# Patient Record
Sex: MALE | Race: WHITE | Employment: UNEMPLOYED | URBAN - METROPOLITAN AREA
[De-identification: names, ages, dates, MRNs, and addresses within clinical notes are randomized per-mention and may not be internally consistent; named-entity substitution may affect disease eponyms.]

---

## 2017-06-05 ENCOUNTER — HOSPITAL ENCOUNTER (OUTPATIENT)
Dept: SURGERY | Age: 32
Discharge: HOME | End: 2017-06-05
Attending: INTERNAL MEDICINE | Admitting: INTERNAL MEDICINE

## 2017-06-05 VITALS
BODY MASS INDEX: 19.46 KG/M2 | OXYGEN SATURATION: 100 % | RESPIRATION RATE: 16 BRPM | TEMPERATURE: 98.6 F | HEART RATE: 51 BPM | SYSTOLIC BLOOD PRESSURE: 130 MMHG | DIASTOLIC BLOOD PRESSURE: 85 MMHG | HEIGHT: 67 IN | WEIGHT: 124 LBS

## 2017-06-05 RX ORDER — LIDOCAINE HYDROCHLORIDE 10 MG/ML
0.1 INJECTION, SOLUTION EPIDURAL; INFILTRATION; INTRACAUDAL; PERINEURAL
Status: ACTIVE | OUTPATIENT
Start: 2017-06-05 | End: 2017-06-05

## 2017-06-05 RX ORDER — SODIUM CHLORIDE, SODIUM LACTATE, POTASSIUM CHLORIDE, CALCIUM CHLORIDE 600; 310; 30; 20 MG/100ML; MG/100ML; MG/100ML; MG/100ML
INJECTION, SOLUTION INTRAVENOUS ONCE
Status: COMPLETED | OUTPATIENT
Start: 2017-06-05 | End: 2017-06-05

## 2017-06-05 RX ADMIN — SODIUM CHLORIDE, SODIUM LACTATE, POTASSIUM CHLORIDE, CALCIUM CHLORIDE: 600; 310; 30; 20 INJECTION, SOLUTION INTRAVENOUS at 09:23

## 2017-06-05 ASSESSMENT — PAIN SCALES - GENERAL
PAINLEVEL_OUTOF10: 0
PAINLEVEL_OUTOF10: 0

## 2017-06-05 ASSESSMENT — PAIN - FUNCTIONAL ASSESSMENT: PAIN_FUNCTIONAL_ASSESSMENT: 0-10

## 2017-06-20 ENCOUNTER — HOSPITAL ENCOUNTER (OUTPATIENT)
Dept: PHYSICAL THERAPY | Age: 32
Discharge: HOME | End: 2017-06-30
Admitting: FAMILY MEDICINE

## 2017-06-20 ASSESSMENT — PAIN SCALES - GENERAL: PAINLEVEL_OUTOF10: 6

## 2017-07-03 ENCOUNTER — HOSPITAL ENCOUNTER (OUTPATIENT)
Dept: SURGERY | Age: 32
Discharge: HOME | End: 2017-07-03
Admitting: PODIATRIST

## 2017-07-03 VITALS
OXYGEN SATURATION: 100 % | BODY MASS INDEX: 19.46 KG/M2 | DIASTOLIC BLOOD PRESSURE: 88 MMHG | HEART RATE: 85 BPM | RESPIRATION RATE: 14 BRPM | WEIGHT: 124 LBS | HEIGHT: 67 IN | TEMPERATURE: 98.4 F | SYSTOLIC BLOOD PRESSURE: 127 MMHG

## 2017-07-03 RX ORDER — SODIUM CHLORIDE 0.9 % (FLUSH) 0.9 %
10 SYRINGE (ML) INJECTION EVERY 12 HOURS SCHEDULED
Status: DISCONTINUED | OUTPATIENT
Start: 2017-07-03 | End: 2017-07-04 | Stop reason: HOSPADM

## 2017-07-03 RX ORDER — LABETALOL HYDROCHLORIDE 5 MG/ML
5 INJECTION, SOLUTION INTRAVENOUS EVERY 10 MIN PRN
Status: DISCONTINUED | OUTPATIENT
Start: 2017-07-03 | End: 2017-07-04 | Stop reason: HOSPADM

## 2017-07-03 RX ORDER — MEPERIDINE HYDROCHLORIDE 50 MG/ML
12.5 INJECTION INTRAMUSCULAR; INTRAVENOUS; SUBCUTANEOUS EVERY 5 MIN PRN
Status: DISCONTINUED | OUTPATIENT
Start: 2017-07-03 | End: 2017-07-04 | Stop reason: HOSPADM

## 2017-07-03 RX ORDER — SODIUM CHLORIDE 0.9 % (FLUSH) 0.9 %
10 SYRINGE (ML) INJECTION PRN
Status: DISCONTINUED | OUTPATIENT
Start: 2017-07-03 | End: 2017-07-04 | Stop reason: HOSPADM

## 2017-07-03 RX ORDER — SODIUM CHLORIDE, SODIUM LACTATE, POTASSIUM CHLORIDE, CALCIUM CHLORIDE 600; 310; 30; 20 MG/100ML; MG/100ML; MG/100ML; MG/100ML
INJECTION, SOLUTION INTRAVENOUS CONTINUOUS
Status: DISCONTINUED | OUTPATIENT
Start: 2017-07-03 | End: 2017-07-04 | Stop reason: HOSPADM

## 2017-07-03 RX ORDER — MORPHINE SULFATE 2 MG/ML
1 INJECTION, SOLUTION INTRAMUSCULAR; INTRAVENOUS EVERY 5 MIN PRN
Status: DISCONTINUED | OUTPATIENT
Start: 2017-07-03 | End: 2017-07-04 | Stop reason: HOSPADM

## 2017-07-03 RX ORDER — LIDOCAINE HYDROCHLORIDE 10 MG/ML
1 INJECTION, SOLUTION EPIDURAL; INFILTRATION; INTRACAUDAL; PERINEURAL
Status: ACTIVE | OUTPATIENT
Start: 2017-07-03 | End: 2017-07-03

## 2017-07-03 RX ORDER — OXYCODONE HYDROCHLORIDE 5 MG/1
10 TABLET ORAL PRN
Status: COMPLETED | OUTPATIENT
Start: 2017-07-03 | End: 2017-07-03

## 2017-07-03 RX ORDER — METOCLOPRAMIDE HYDROCHLORIDE 5 MG/ML
10 INJECTION INTRAMUSCULAR; INTRAVENOUS
Status: ACTIVE | OUTPATIENT
Start: 2017-07-03 | End: 2017-07-03

## 2017-07-03 RX ORDER — HYDRALAZINE HYDROCHLORIDE 20 MG/ML
5 INJECTION INTRAMUSCULAR; INTRAVENOUS EVERY 10 MIN PRN
Status: DISCONTINUED | OUTPATIENT
Start: 2017-07-03 | End: 2017-07-04 | Stop reason: HOSPADM

## 2017-07-03 RX ORDER — PROMETHAZINE HYDROCHLORIDE 25 MG/ML
6.25 INJECTION, SOLUTION INTRAMUSCULAR; INTRAVENOUS
Status: ACTIVE | OUTPATIENT
Start: 2017-07-03 | End: 2017-07-03

## 2017-07-03 RX ORDER — OXYCODONE HYDROCHLORIDE 5 MG/1
5 TABLET ORAL PRN
Status: COMPLETED | OUTPATIENT
Start: 2017-07-03 | End: 2017-07-03

## 2017-07-03 RX ORDER — HYDROCODONE BITARTRATE AND ACETAMINOPHEN 5; 325 MG/1; MG/1
1 TABLET ORAL EVERY 6 HOURS PRN
Qty: 20 TABLET | Refills: 0 | Status: SHIPPED | OUTPATIENT
Start: 2017-07-03 | End: 2017-07-10

## 2017-07-03 RX ORDER — DIPHENHYDRAMINE HYDROCHLORIDE 50 MG/ML
12.5 INJECTION INTRAMUSCULAR; INTRAVENOUS
Status: ACTIVE | OUTPATIENT
Start: 2017-07-03 | End: 2017-07-03

## 2017-07-03 RX ADMIN — OXYCODONE HYDROCHLORIDE 5 MG: 5 TABLET ORAL at 08:28

## 2017-07-03 RX ADMIN — SODIUM CHLORIDE, SODIUM LACTATE, POTASSIUM CHLORIDE, CALCIUM CHLORIDE: 600; 310; 30; 20 INJECTION, SOLUTION INTRAVENOUS at 06:25

## 2017-07-03 ASSESSMENT — PAIN - FUNCTIONAL ASSESSMENT: PAIN_FUNCTIONAL_ASSESSMENT: 0-10

## 2017-07-03 ASSESSMENT — PAIN SCALES - GENERAL
PAINLEVEL_OUTOF10: 4
PAINLEVEL_OUTOF10: 3
PAINLEVEL_OUTOF10: 1
PAINLEVEL_OUTOF10: 1
PAINLEVEL_OUTOF10: 4
PAINLEVEL_OUTOF10: 1

## 2017-07-14 ENCOUNTER — HOSPITAL ENCOUNTER (OUTPATIENT)
Dept: PHYSICAL THERAPY | Age: 32
Discharge: HOME | End: 2017-07-14
Admitting: FAMILY MEDICINE

## 2018-01-19 ENCOUNTER — OFFICE VISIT (OUTPATIENT)
Dept: ORTHOPEDIC SURGERY | Age: 33
End: 2018-01-19

## 2018-01-19 VITALS
HEART RATE: 85 BPM | DIASTOLIC BLOOD PRESSURE: 80 MMHG | BODY MASS INDEX: 19.45 KG/M2 | SYSTOLIC BLOOD PRESSURE: 121 MMHG | HEIGHT: 67 IN | WEIGHT: 123.9 LBS

## 2018-01-19 DIAGNOSIS — S43.101A ACROMIOCLAVICULAR JOINT SEPARATION, RIGHT, INITIAL ENCOUNTER: ICD-10-CM

## 2018-01-19 DIAGNOSIS — M25.511 ACUTE PAIN OF RIGHT SHOULDER: ICD-10-CM

## 2018-01-19 DIAGNOSIS — M54.2 NECK PAIN: Primary | ICD-10-CM

## 2018-01-19 PROCEDURE — G8484 FLU IMMUNIZE NO ADMIN: HCPCS | Performed by: ORTHOPAEDIC SURGERY

## 2018-01-19 PROCEDURE — 1036F TOBACCO NON-USER: CPT | Performed by: ORTHOPAEDIC SURGERY

## 2018-01-19 PROCEDURE — 72040 X-RAY EXAM NECK SPINE 2-3 VW: CPT | Performed by: ORTHOPAEDIC SURGERY

## 2018-01-19 PROCEDURE — 99202 OFFICE O/P NEW SF 15 MIN: CPT | Performed by: ORTHOPAEDIC SURGERY

## 2018-01-19 PROCEDURE — 73030 X-RAY EXAM OF SHOULDER: CPT | Performed by: ORTHOPAEDIC SURGERY

## 2018-01-19 PROCEDURE — G8427 DOCREV CUR MEDS BY ELIG CLIN: HCPCS | Performed by: ORTHOPAEDIC SURGERY

## 2018-01-19 PROCEDURE — G8420 CALC BMI NORM PARAMETERS: HCPCS | Performed by: ORTHOPAEDIC SURGERY

## 2018-01-19 RX ORDER — FLUOCINONIDE GEL 0.5 MG/G
GEL TOPICAL
COMMUNITY
Start: 2017-11-20 | End: 2018-10-04 | Stop reason: ALTCHOICE

## 2018-01-19 RX ORDER — NAPROXEN 375 MG/1
TABLET ORAL
COMMUNITY
Start: 2017-11-05 | End: 2018-04-13

## 2018-01-19 RX ORDER — CICLOPIROX 7.7 MG/G
GEL TOPICAL
COMMUNITY
Start: 2017-11-17 | End: 2018-10-04 | Stop reason: ALTCHOICE
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
## 2018-01-19 NOTE — PATIENT INSTRUCTIONS
Patient Education        Shoulder Pain: Care Instructions  Your Care Instructions    You can hurt your shoulder by using it too much during an activity, such as fishing or baseball. It can also happen as part of the everyday wear and tear of getting older. Shoulder injuries can be slow to heal, but your shoulder should get better with time. Your doctor may recommend a sling to rest your shoulder. If you have injured your shoulder, you may need testing and treatment. Follow-up care is a key part of your treatment and safety. Be sure to make and go to all appointments, and call your doctor if you are having problems. It's also a good idea to know your test results and keep a list of the medicines you take. How can you care for yourself at home? · Take pain medicines exactly as directed. ¨ If the doctor gave you a prescription medicine for pain, take it as prescribed. ¨ If you are not taking a prescription pain medicine, ask your doctor if you can take an over-the-counter medicine. ¨ Do not take two or more pain medicines at the same time unless the doctor told you to. Many pain medicines contain acetaminophen, which is Tylenol. Too much acetaminophen (Tylenol) can be harmful. · If your doctor recommends that you wear a sling, use it as directed. Do not take it off before your doctor tells you to. · Put ice or a cold pack on the sore area for 10 to 20 minutes at a time. Put a thin cloth between the ice and your skin. · If there is no swelling, you can put moist heat, a heating pad, or a warm cloth on your shoulder. Some doctors suggest alternating between hot and cold. · Rest your shoulder for a few days. If your doctor recommends it, you can then begin gentle exercise of the shoulder, but do not lift anything heavy. When should you call for help? Call 911 anytime you think you may need emergency care. For example, call if:  ? · You have chest pain or pressure.  This may occur

## 2018-01-23 ENCOUNTER — TELEPHONE (OUTPATIENT)
Dept: ORTHOPEDIC SURGERY | Age: 33
End: 2018-01-23

## 2018-01-30 ENCOUNTER — HOSPITAL ENCOUNTER (OUTPATIENT)
Dept: INTERVENTIONAL RADIOLOGY/VASCULAR | Age: 33
Discharge: HOME | End: 2018-01-30
Admitting: ORTHOPAEDIC SURGERY

## 2018-01-30 DIAGNOSIS — S43.101A ACROMIOCLAVICULAR JOINT SEPARATION, RIGHT, INITIAL ENCOUNTER: ICD-10-CM

## 2018-01-30 DIAGNOSIS — M25.511 PAIN IN RIGHT SHOULDER: ICD-10-CM

## 2018-01-30 DIAGNOSIS — M25.511 ACUTE PAIN OF RIGHT SHOULDER: ICD-10-CM

## 2018-02-02 ENCOUNTER — OFFICE VISIT (OUTPATIENT)
Dept: ORTHOPEDIC SURGERY | Age: 33
End: 2018-02-02

## 2018-02-02 VITALS
HEART RATE: 82 BPM | BODY MASS INDEX: 19.45 KG/M2 | WEIGHT: 123.9 LBS | HEIGHT: 67 IN | SYSTOLIC BLOOD PRESSURE: 121 MMHG | DIASTOLIC BLOOD PRESSURE: 80 MMHG

## 2018-02-02 DIAGNOSIS — M89.50 OSTEOLYSIS: Primary | ICD-10-CM

## 2018-02-02 PROCEDURE — 1036F TOBACCO NON-USER: CPT | Performed by: ORTHOPAEDIC SURGERY

## 2018-02-02 PROCEDURE — G8420 CALC BMI NORM PARAMETERS: HCPCS | Performed by: ORTHOPAEDIC SURGERY

## 2018-02-02 PROCEDURE — G8427 DOCREV CUR MEDS BY ELIG CLIN: HCPCS | Performed by: ORTHOPAEDIC SURGERY

## 2018-02-02 PROCEDURE — 99213 OFFICE O/P EST LOW 20 MIN: CPT | Performed by: ORTHOPAEDIC SURGERY

## 2018-02-02 PROCEDURE — G8484 FLU IMMUNIZE NO ADMIN: HCPCS | Performed by: ORTHOPAEDIC SURGERY

## 2018-02-02 RX ORDER — MELOXICAM 15 MG/1
TABLET ORAL
Qty: 30 TABLET | Refills: 3 | Status: SHIPPED | OUTPATIENT
Start: 2018-02-02 | End: 2018-04-13
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
## 2018-03-23 ENCOUNTER — OFFICE VISIT (OUTPATIENT)
Dept: ORTHOPEDIC SURGERY | Age: 33
End: 2018-03-23

## 2018-03-23 VITALS
BODY MASS INDEX: 19.45 KG/M2 | SYSTOLIC BLOOD PRESSURE: 124 MMHG | HEIGHT: 67 IN | HEART RATE: 78 BPM | WEIGHT: 123.9 LBS | DIASTOLIC BLOOD PRESSURE: 74 MMHG

## 2018-03-23 DIAGNOSIS — S43.101A ACROMIOCLAVICULAR JOINT SEPARATION, RIGHT, INITIAL ENCOUNTER: Primary | ICD-10-CM

## 2018-03-23 DIAGNOSIS — M89.50 OSTEOLYSIS: ICD-10-CM

## 2018-03-23 PROCEDURE — G8420 CALC BMI NORM PARAMETERS: HCPCS | Performed by: ORTHOPAEDIC SURGERY

## 2018-03-23 PROCEDURE — 1036F TOBACCO NON-USER: CPT | Performed by: ORTHOPAEDIC SURGERY

## 2018-03-23 PROCEDURE — G8484 FLU IMMUNIZE NO ADMIN: HCPCS | Performed by: ORTHOPAEDIC SURGERY

## 2018-03-23 PROCEDURE — 99213 OFFICE O/P EST LOW 20 MIN: CPT | Performed by: ORTHOPAEDIC SURGERY

## 2018-03-23 PROCEDURE — G8427 DOCREV CUR MEDS BY ELIG CLIN: HCPCS | Performed by: ORTHOPAEDIC SURGERY

## 2018-03-23 RX ORDER — NAPROXEN 500 MG/1
TABLET ORAL
COMMUNITY
Start: 2018-01-23 | End: 2018-04-13

## 2018-03-23 RX ORDER — HYDROCODONE BITARTRATE AND ACETAMINOPHEN 5; 325 MG/1; MG/1
TABLET ORAL
COMMUNITY
Start: 2018-01-23 | End: 2018-04-13

## 2018-05-22 ENCOUNTER — TELEPHONE (OUTPATIENT)
Dept: ORTHOPEDIC SURGERY | Age: 33
End: 2018-05-22

## 2018-09-18 ENCOUNTER — OFFICE VISIT (OUTPATIENT)
Dept: ORTHOPEDIC SURGERY | Age: 33
End: 2018-09-18

## 2018-09-18 VITALS
DIASTOLIC BLOOD PRESSURE: 74 MMHG | SYSTOLIC BLOOD PRESSURE: 127 MMHG | HEIGHT: 67 IN | WEIGHT: 130 LBS | HEART RATE: 74 BPM | BODY MASS INDEX: 20.4 KG/M2

## 2018-09-18 DIAGNOSIS — M25.511 ACUTE PAIN OF RIGHT SHOULDER: ICD-10-CM

## 2018-09-18 DIAGNOSIS — S43.51XS: Primary | ICD-10-CM

## 2018-09-18 PROCEDURE — G8427 DOCREV CUR MEDS BY ELIG CLIN: HCPCS | Performed by: ORTHOPAEDIC SURGERY

## 2018-09-18 PROCEDURE — G8420 CALC BMI NORM PARAMETERS: HCPCS | Performed by: ORTHOPAEDIC SURGERY

## 2018-09-18 PROCEDURE — 99213 OFFICE O/P EST LOW 20 MIN: CPT | Performed by: ORTHOPAEDIC SURGERY

## 2018-09-18 PROCEDURE — 4004F PT TOBACCO SCREEN RCVD TLK: CPT | Performed by: ORTHOPAEDIC SURGERY

## 2018-09-20 ENCOUNTER — OFFICE VISIT (OUTPATIENT)
Dept: ORTHOPEDIC SURGERY | Age: 33
End: 2018-09-20

## 2018-09-20 VITALS — HEIGHT: 67 IN | BODY MASS INDEX: 20.4 KG/M2 | WEIGHT: 130 LBS

## 2018-09-20 DIAGNOSIS — M89.511 OSTEOLYSIS OF ACROMIAL END OF RIGHT CLAVICLE: Primary | ICD-10-CM

## 2018-09-20 PROCEDURE — G8420 CALC BMI NORM PARAMETERS: HCPCS | Performed by: ORTHOPAEDIC SURGERY

## 2018-09-20 PROCEDURE — 99214 OFFICE O/P EST MOD 30 MIN: CPT | Performed by: ORTHOPAEDIC SURGERY

## 2018-09-20 PROCEDURE — 4004F PT TOBACCO SCREEN RCVD TLK: CPT | Performed by: ORTHOPAEDIC SURGERY

## 2018-09-20 PROCEDURE — G8427 DOCREV CUR MEDS BY ELIG CLIN: HCPCS | Performed by: ORTHOPAEDIC SURGERY
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
## 2018-10-02 ENCOUNTER — TELEPHONE (OUTPATIENT)
Dept: ORTHOPEDIC SURGERY | Age: 33
End: 2018-10-02

## 2018-10-05 ENCOUNTER — ANESTHESIA EVENT (OUTPATIENT)
Dept: OPERATING ROOM | Age: 33
End: 2018-10-05
Payer: MEDICARE

## 2018-10-05 RX ORDER — SODIUM CHLORIDE 0.9 % (FLUSH) 0.9 %
10 SYRINGE (ML) INJECTION EVERY 12 HOURS SCHEDULED
Status: CANCELLED | OUTPATIENT
Start: 2018-10-05

## 2018-10-05 RX ORDER — SODIUM CHLORIDE 0.9 % (FLUSH) 0.9 %
10 SYRINGE (ML) INJECTION PRN
Status: CANCELLED | OUTPATIENT
Start: 2018-10-05

## 2018-10-12 RX ORDER — SODIUM CHLORIDE 0.9 % (FLUSH) 0.9 %
10 SYRINGE (ML) INJECTION EVERY 12 HOURS SCHEDULED
Status: CANCELLED | OUTPATIENT
Start: 2018-10-12

## 2018-10-12 RX ORDER — SODIUM CHLORIDE 0.9 % (FLUSH) 0.9 %
10 SYRINGE (ML) INJECTION PRN
Status: CANCELLED | OUTPATIENT
Start: 2018-10-12

## 2018-10-12 RX ORDER — SODIUM CHLORIDE, SODIUM LACTATE, POTASSIUM CHLORIDE, CALCIUM CHLORIDE 600; 310; 30; 20 MG/100ML; MG/100ML; MG/100ML; MG/100ML
INJECTION, SOLUTION INTRAVENOUS CONTINUOUS
Status: CANCELLED | OUTPATIENT
Start: 2018-10-12

## 2018-10-12 RX ORDER — LIDOCAINE HYDROCHLORIDE 10 MG/ML
1 INJECTION, SOLUTION EPIDURAL; INFILTRATION; INTRACAUDAL; PERINEURAL
Status: CANCELLED | OUTPATIENT
Start: 2018-10-12 | End: 2018-10-12

## 2018-10-15 ENCOUNTER — HOSPITAL ENCOUNTER (OUTPATIENT)
Age: 33
Setting detail: OUTPATIENT SURGERY
Discharge: HOME | End: 2018-10-15
Attending: ORTHOPAEDIC SURGERY | Admitting: ORTHOPAEDIC SURGERY
Payer: MEDICARE

## 2018-10-15 ENCOUNTER — ANESTHESIA (OUTPATIENT)
Dept: OPERATING ROOM | Age: 33
End: 2018-10-15
Payer: MEDICARE

## 2018-10-15 VITALS
TEMPERATURE: 97 F | WEIGHT: 130 LBS | RESPIRATION RATE: 18 BRPM | DIASTOLIC BLOOD PRESSURE: 88 MMHG | OXYGEN SATURATION: 95 % | HEIGHT: 67 IN | BODY MASS INDEX: 20.4 KG/M2 | HEART RATE: 62 BPM | SYSTOLIC BLOOD PRESSURE: 124 MMHG

## 2018-10-15 VITALS
OXYGEN SATURATION: 100 % | DIASTOLIC BLOOD PRESSURE: 64 MMHG | RESPIRATION RATE: 23 BRPM | SYSTOLIC BLOOD PRESSURE: 100 MMHG

## 2018-10-15 DIAGNOSIS — M89.511 OSTEOLYSIS OF ACROMIAL END OF RIGHT CLAVICLE: Primary | ICD-10-CM

## 2018-10-15 PROCEDURE — 3600000014 HC SURGERY LEVEL 4 ADDTL 15MIN: Performed by: ORTHOPAEDIC SURGERY

## 2018-10-15 PROCEDURE — 6370000000 HC RX 637 (ALT 250 FOR IP): Performed by: ANESTHESIOLOGY

## 2018-10-15 PROCEDURE — 64415 NJX AA&/STRD BRCH PLXS IMG: CPT | Performed by: ANESTHESIOLOGY

## 2018-10-15 PROCEDURE — 3700000000 HC ANESTHESIA ATTENDED CARE: Performed by: ORTHOPAEDIC SURGERY

## 2018-10-15 PROCEDURE — 2500000003 HC RX 250 WO HCPCS: Performed by: ANESTHESIOLOGY

## 2018-10-15 PROCEDURE — 2709999900 HC NON-CHARGEABLE SUPPLY: Performed by: ORTHOPAEDIC SURGERY

## 2018-10-15 PROCEDURE — 6360000002 HC RX W HCPCS: Performed by: ANESTHESIOLOGY

## 2018-10-15 PROCEDURE — 3700000001 HC ADD 15 MINUTES (ANESTHESIA): Performed by: ORTHOPAEDIC SURGERY

## 2018-10-15 PROCEDURE — 6360000002 HC RX W HCPCS: Performed by: NURSE ANESTHETIST, CERTIFIED REGISTERED

## 2018-10-15 PROCEDURE — 7100000011 HC PHASE II RECOVERY - ADDTL 15 MIN: Performed by: ORTHOPAEDIC SURGERY

## 2018-10-15 PROCEDURE — 6360000002 HC RX W HCPCS: Performed by: ORTHOPAEDIC SURGERY

## 2018-10-15 PROCEDURE — 3600000004 HC SURGERY LEVEL 4 BASE: Performed by: ORTHOPAEDIC SURGERY

## 2018-10-15 PROCEDURE — 7100000010 HC PHASE II RECOVERY - FIRST 15 MIN: Performed by: ORTHOPAEDIC SURGERY

## 2018-10-15 PROCEDURE — 7100000000 HC PACU RECOVERY - FIRST 15 MIN: Performed by: ORTHOPAEDIC SURGERY

## 2018-10-15 PROCEDURE — 2580000003 HC RX 258: Performed by: ORTHOPAEDIC SURGERY

## 2018-10-15 PROCEDURE — 7100000001 HC PACU RECOVERY - ADDTL 15 MIN: Performed by: ORTHOPAEDIC SURGERY

## 2018-10-15 PROCEDURE — 2500000003 HC RX 250 WO HCPCS: Performed by: NURSE ANESTHETIST, CERTIFIED REGISTERED

## 2018-10-15 PROCEDURE — 2580000003 HC RX 258: Performed by: ANESTHESIOLOGY

## 2018-10-15 RX ORDER — LIDOCAINE HYDROCHLORIDE 10 MG/ML
1 INJECTION, SOLUTION EPIDURAL; INFILTRATION; INTRACAUDAL; PERINEURAL
Status: COMPLETED | OUTPATIENT
Start: 2018-10-15 | End: 2018-10-15

## 2018-10-15 RX ORDER — PROPOFOL 10 MG/ML
INJECTION, EMULSION INTRAVENOUS PRN
Status: DISCONTINUED | OUTPATIENT
Start: 2018-10-15 | End: 2018-10-15 | Stop reason: SDUPTHER

## 2018-10-15 RX ORDER — HYDROMORPHONE HCL 110MG/55ML
0.5 PATIENT CONTROLLED ANALGESIA SYRINGE INTRAVENOUS EVERY 5 MIN PRN
Status: COMPLETED | OUTPATIENT
Start: 2018-10-15 | End: 2018-10-15

## 2018-10-15 RX ORDER — ONDANSETRON 2 MG/ML
INJECTION INTRAMUSCULAR; INTRAVENOUS PRN
Status: DISCONTINUED | OUTPATIENT
Start: 2018-10-15 | End: 2018-10-15 | Stop reason: SDUPTHER

## 2018-10-15 RX ORDER — DEXAMETHASONE SODIUM PHOSPHATE 4 MG/ML
INJECTION, SOLUTION INTRA-ARTICULAR; INTRALESIONAL; INTRAMUSCULAR; INTRAVENOUS; SOFT TISSUE PRN
Status: DISCONTINUED | OUTPATIENT
Start: 2018-10-15 | End: 2018-10-15 | Stop reason: SDUPTHER

## 2018-10-15 RX ORDER — ACETAMINOPHEN 10 MG/ML
1000 INJECTION, SOLUTION INTRAVENOUS ONCE
Status: COMPLETED | OUTPATIENT
Start: 2018-10-15 | End: 2018-10-15

## 2018-10-15 RX ORDER — MIDAZOLAM HYDROCHLORIDE 1 MG/ML
INJECTION INTRAMUSCULAR; INTRAVENOUS
Status: COMPLETED
Start: 2018-10-15 | End: 2018-10-15

## 2018-10-15 RX ORDER — LIDOCAINE HYDROCHLORIDE 20 MG/ML
INJECTION, SOLUTION INFILTRATION; PERINEURAL PRN
Status: DISCONTINUED | OUTPATIENT
Start: 2018-10-15 | End: 2018-10-15 | Stop reason: SDUPTHER

## 2018-10-15 RX ORDER — SODIUM CHLORIDE, SODIUM LACTATE, POTASSIUM CHLORIDE, CALCIUM CHLORIDE 600; 310; 30; 20 MG/100ML; MG/100ML; MG/100ML; MG/100ML
INJECTION, SOLUTION INTRAVENOUS CONTINUOUS
Status: DISCONTINUED | OUTPATIENT
Start: 2018-10-15 | End: 2018-10-15 | Stop reason: HOSPADM

## 2018-10-15 RX ORDER — FENTANYL CITRATE 50 UG/ML
INJECTION, SOLUTION INTRAMUSCULAR; INTRAVENOUS PRN
Status: DISCONTINUED | OUTPATIENT
Start: 2018-10-15 | End: 2018-10-15 | Stop reason: SDUPTHER

## 2018-10-15 RX ORDER — ROCURONIUM BROMIDE 10 MG/ML
INJECTION, SOLUTION INTRAVENOUS PRN
Status: DISCONTINUED | OUTPATIENT
Start: 2018-10-15 | End: 2018-10-15 | Stop reason: SDUPTHER

## 2018-10-15 RX ORDER — HYDRALAZINE HYDROCHLORIDE 20 MG/ML
5 INJECTION INTRAMUSCULAR; INTRAVENOUS EVERY 10 MIN PRN
Status: DISCONTINUED | OUTPATIENT
Start: 2018-10-15 | End: 2018-10-15 | Stop reason: HOSPADM

## 2018-10-15 RX ORDER — BUPIVACAINE HYDROCHLORIDE 2.5 MG/ML
INJECTION, SOLUTION EPIDURAL; INFILTRATION; INTRACAUDAL PRN
Status: DISCONTINUED | OUTPATIENT
Start: 2018-10-15 | End: 2018-10-15 | Stop reason: SDUPTHER

## 2018-10-15 RX ORDER — HYDROMORPHONE HCL 110MG/55ML
0.25 PATIENT CONTROLLED ANALGESIA SYRINGE INTRAVENOUS EVERY 5 MIN PRN
Status: DISCONTINUED | OUTPATIENT
Start: 2018-10-15 | End: 2018-10-15 | Stop reason: HOSPADM

## 2018-10-15 RX ORDER — OXYCODONE HYDROCHLORIDE 5 MG/1
5 TABLET ORAL ONCE
Status: COMPLETED | OUTPATIENT
Start: 2018-10-15 | End: 2018-10-15

## 2018-10-15 RX ORDER — FENTANYL CITRATE 50 UG/ML
25 INJECTION, SOLUTION INTRAMUSCULAR; INTRAVENOUS EVERY 5 MIN PRN
Status: DISCONTINUED | OUTPATIENT
Start: 2018-10-15 | End: 2018-10-15 | Stop reason: HOSPADM

## 2018-10-15 RX ORDER — BUPIVACAINE HYDROCHLORIDE 5 MG/ML
INJECTION, SOLUTION EPIDURAL; INTRACAUDAL
Status: DISCONTINUED
Start: 2018-10-15 | End: 2018-10-15 | Stop reason: WASHOUT

## 2018-10-15 RX ORDER — OXYCODONE HYDROCHLORIDE 5 MG/1
TABLET ORAL
Status: DISCONTINUED
Start: 2018-10-15 | End: 2018-10-15 | Stop reason: HOSPADM

## 2018-10-15 RX ORDER — FENTANYL CITRATE 50 UG/ML
INJECTION, SOLUTION INTRAMUSCULAR; INTRAVENOUS
Status: COMPLETED
Start: 2018-10-15 | End: 2018-10-15

## 2018-10-15 RX ORDER — OXYCODONE HYDROCHLORIDE AND ACETAMINOPHEN 5; 325 MG/1; MG/1
2 TABLET ORAL PRN
Status: DISCONTINUED | OUTPATIENT
Start: 2018-10-15 | End: 2018-10-15 | Stop reason: HOSPADM

## 2018-10-15 RX ORDER — OXYCODONE HYDROCHLORIDE AND ACETAMINOPHEN 5; 325 MG/1; MG/1
1 TABLET ORAL PRN
Status: DISCONTINUED | OUTPATIENT
Start: 2018-10-15 | End: 2018-10-15 | Stop reason: HOSPADM

## 2018-10-15 RX ORDER — OXYCODONE HYDROCHLORIDE AND ACETAMINOPHEN 5; 325 MG/1; MG/1
1 TABLET ORAL EVERY 6 HOURS PRN
Qty: 20 TABLET | Refills: 0 | Status: SHIPPED | OUTPATIENT
Start: 2018-10-15 | End: 2018-10-22 | Stop reason: SDUPTHER

## 2018-10-15 RX ORDER — 0.9 % SODIUM CHLORIDE 0.9 %
VIAL (ML) INJECTION
Status: DISCONTINUED
Start: 2018-10-15 | End: 2018-10-15 | Stop reason: HOSPADM

## 2018-10-15 RX ORDER — BUPIVACAINE HYDROCHLORIDE 7.5 MG/ML
INJECTION, SOLUTION EPIDURAL; RETROBULBAR
Status: DISCONTINUED
Start: 2018-10-15 | End: 2018-10-15 | Stop reason: HOSPADM

## 2018-10-15 RX ORDER — HYDROMORPHONE HCL 110MG/55ML
0.5 PATIENT CONTROLLED ANALGESIA SYRINGE INTRAVENOUS EVERY 5 MIN PRN
Status: DISCONTINUED | OUTPATIENT
Start: 2018-10-15 | End: 2018-10-15 | Stop reason: HOSPADM

## 2018-10-15 RX ORDER — MEPERIDINE HYDROCHLORIDE 25 MG/ML
12.5 INJECTION INTRAMUSCULAR; INTRAVENOUS; SUBCUTANEOUS EVERY 5 MIN PRN
Status: DISCONTINUED | OUTPATIENT
Start: 2018-10-15 | End: 2018-10-15 | Stop reason: HOSPADM

## 2018-10-15 RX ORDER — MIDAZOLAM HYDROCHLORIDE 1 MG/ML
INJECTION INTRAMUSCULAR; INTRAVENOUS PRN
Status: DISCONTINUED | OUTPATIENT
Start: 2018-10-15 | End: 2018-10-15 | Stop reason: SDUPTHER

## 2018-10-15 RX ORDER — ONDANSETRON 2 MG/ML
4 INJECTION INTRAMUSCULAR; INTRAVENOUS
Status: DISCONTINUED | OUTPATIENT
Start: 2018-10-15 | End: 2018-10-15 | Stop reason: HOSPADM

## 2018-10-15 RX ADMIN — OXYCODONE HYDROCHLORIDE 5 MG: 5 TABLET ORAL at 12:35

## 2018-10-15 RX ADMIN — LIDOCAINE HYDROCHLORIDE 60 MG: 20 INJECTION, SOLUTION INFILTRATION; PERINEURAL at 10:41

## 2018-10-15 RX ADMIN — CEFAZOLIN SODIUM 2 G: 2 SOLUTION INTRAVENOUS at 10:24

## 2018-10-15 RX ADMIN — LIDOCAINE HYDROCHLORIDE 1 ML: 10 INJECTION, SOLUTION EPIDURAL; INFILTRATION; INTRACAUDAL; PERINEURAL at 09:17

## 2018-10-15 RX ADMIN — HYDROMORPHONE HYDROCHLORIDE 0.5 MG: 2 INJECTION INTRAMUSCULAR; INTRAVENOUS; SUBCUTANEOUS at 12:15

## 2018-10-15 RX ADMIN — FENTANYL CITRATE 100 MCG: 50 INJECTION INTRAMUSCULAR; INTRAVENOUS at 09:30

## 2018-10-15 RX ADMIN — PROPOFOL 200 MG: 10 INJECTION, EMULSION INTRAVENOUS at 10:41

## 2018-10-15 RX ADMIN — DEXAMETHASONE SODIUM PHOSPHATE 8 MG: 4 INJECTION, SOLUTION INTRAMUSCULAR; INTRAVENOUS at 10:41

## 2018-10-15 RX ADMIN — HYDROMORPHONE HYDROCHLORIDE 0.5 MG: 2 INJECTION INTRAMUSCULAR; INTRAVENOUS; SUBCUTANEOUS at 12:00

## 2018-10-15 RX ADMIN — BUPIVACAINE HYDROCHLORIDE 30 ML: 2.5 INJECTION, SOLUTION EPIDURAL; INFILTRATION; INTRACAUDAL; PERINEURAL at 09:35

## 2018-10-15 RX ADMIN — ROCURONIUM BROMIDE 50 MG: 10 INJECTION, SOLUTION INTRAVENOUS at 10:41

## 2018-10-15 RX ADMIN — SODIUM CHLORIDE, POTASSIUM CHLORIDE, SODIUM LACTATE AND CALCIUM CHLORIDE: 600; 310; 30; 20 INJECTION, SOLUTION INTRAVENOUS at 09:17

## 2018-10-15 RX ADMIN — HYDROMORPHONE HYDROCHLORIDE 0.5 MG: 2 INJECTION INTRAMUSCULAR; INTRAVENOUS; SUBCUTANEOUS at 12:05

## 2018-10-15 RX ADMIN — ACETAMINOPHEN 1000 MG: 10 INJECTION, SOLUTION INTRAVENOUS at 09:18

## 2018-10-15 RX ADMIN — MIDAZOLAM 2 MG: 1 INJECTION INTRAMUSCULAR; INTRAVENOUS at 09:30

## 2018-10-15 RX ADMIN — SUGAMMADEX 200 MG: 100 INJECTION, SOLUTION INTRAVENOUS at 11:24

## 2018-10-15 RX ADMIN — FENTANYL CITRATE 100 MCG: 50 INJECTION INTRAMUSCULAR; INTRAVENOUS at 10:41

## 2018-10-15 RX ADMIN — ONDANSETRON 4 MG: 2 INJECTION, SOLUTION INTRAMUSCULAR; INTRAVENOUS at 10:41

## 2018-10-15 RX ADMIN — HYDROMORPHONE HYDROCHLORIDE 0.5 MG: 2 INJECTION INTRAMUSCULAR; INTRAVENOUS; SUBCUTANEOUS at 12:10

## 2018-10-15 ASSESSMENT — PAIN DESCRIPTION - ORIENTATION
ORIENTATION: RIGHT

## 2018-10-15 ASSESSMENT — PULMONARY FUNCTION TESTS
PIF_VALUE: 17
PIF_VALUE: 18
PIF_VALUE: 17
PIF_VALUE: 17
PIF_VALUE: 18
PIF_VALUE: 17
PIF_VALUE: 22
PIF_VALUE: 17
PIF_VALUE: 17
PIF_VALUE: 5
PIF_VALUE: 17
PIF_VALUE: 3
PIF_VALUE: 16
PIF_VALUE: 17
PIF_VALUE: 2
PIF_VALUE: 18
PIF_VALUE: 21
PIF_VALUE: 14
PIF_VALUE: 15
PIF_VALUE: 16
PIF_VALUE: 3
PIF_VALUE: 17
PIF_VALUE: 17
PIF_VALUE: 16
PIF_VALUE: 18
PIF_VALUE: 17
PIF_VALUE: 18
PIF_VALUE: 18
PIF_VALUE: 17
PIF_VALUE: 14
PIF_VALUE: 17
PIF_VALUE: 18
PIF_VALUE: 17
PIF_VALUE: 17
PIF_VALUE: 14
PIF_VALUE: 18
PIF_VALUE: 2
PIF_VALUE: 17
PIF_VALUE: 2
PIF_VALUE: 17
PIF_VALUE: 2
PIF_VALUE: 18
PIF_VALUE: 18
PIF_VALUE: 17
PIF_VALUE: 18
PIF_VALUE: 2
PIF_VALUE: 16
PIF_VALUE: 11
PIF_VALUE: 18
PIF_VALUE: 18
PIF_VALUE: 16
PIF_VALUE: 17
PIF_VALUE: 18
PIF_VALUE: 18
PIF_VALUE: 16
PIF_VALUE: 11
PIF_VALUE: 18
PIF_VALUE: 12
PIF_VALUE: 1

## 2018-10-15 ASSESSMENT — PAIN DESCRIPTION - DESCRIPTORS
DESCRIPTORS: BURNING
DESCRIPTORS: BURNING
DESCRIPTORS: ACHING
DESCRIPTORS: PRESSURE
DESCRIPTORS: BURNING
DESCRIPTORS: ACHING

## 2018-10-15 ASSESSMENT — PAIN SCALES - GENERAL
PAINLEVEL_OUTOF10: 1
PAINLEVEL_OUTOF10: 6
PAINLEVEL_OUTOF10: 5
PAINLEVEL_OUTOF10: 7
PAINLEVEL_OUTOF10: 5
PAINLEVEL_OUTOF10: 0
PAINLEVEL_OUTOF10: 0
PAINLEVEL_OUTOF10: 7

## 2018-10-15 ASSESSMENT — PAIN DESCRIPTION - LOCATION
LOCATION: SHOULDER

## 2018-10-15 ASSESSMENT — PAIN DESCRIPTION - PAIN TYPE
TYPE: SURGICAL PAIN

## 2018-10-15 ASSESSMENT — LIFESTYLE VARIABLES: SMOKING_STATUS: 1

## 2018-10-15 ASSESSMENT — PAIN DESCRIPTION - FREQUENCY: FREQUENCY: INTERMITTENT

## 2018-10-15 ASSESSMENT — PAIN - FUNCTIONAL ASSESSMENT: PAIN_FUNCTIONAL_ASSESSMENT: 0-10

## 2018-10-15 NOTE — PROGRESS NOTES
Discharged home via w/c in good condition. Verbalized understanding of discharge instructions and written instructions also given to patient's caregiver. No changes to previous assessment.
Patient ambulated to PACU stretcher and placed on cardiac monitor for nerve block. Time Out done at bedside with anesthesiologist .  Assisted anesthesiologist with nerve block.
Phase I (PACU)  1. Patient is identified using name and the date of birth. 2. The patient is free from signs and symptoms of chemical, electrical, laser, radiation, positioning, or transfer/transport injury. 3. The patient receives appropriate medication(s), safely administered during the Perioperative period. 4. The patient has wound/tissue perfusion consistent with or improved from baseline levels established preoperatively. 5. The patient is at or returning to normothermia at the conclusion of the immediate postoperative period. 6. The patient's fluid, electrolyte, and acid base balances are consistent with or improved from baseline levels established preoperatively. 7. The patient's pulmonary function is consistent with or improved from baseline levels established preoperatively. 8. The patient's cardiovascular status is consistent with or improved from baseline levels established preoperatively. 9. The patient/caregiver participates in decisions affecting his or her Perioperative plan of care. 10. The patient's care is consistent with the individualized Perioperative plan of care. 11. The patient's right to privacy is maintained. 12. The patient is the recipient of competent and ethical care within legal standards of practice. 13. The patient's value system, lifestyle, ethnicity, and culture are considered, respected, and incorporated in the Perioperative plan of care. 14. The patient demonstrates and/or reports adequate pain control throughout the the Perioperative period. 15. The patient's neurological status is consistent with or improved from baseline levels established preoperatively. 16. The patient/caregiver demonstrates knowledge of the expected responses to the operative or invasive procedure. 17. Patient/Caregiver has reduced anxiety. Interventions- Familiarize with environment and equipment.   18. Potential for fall the patient will move to fall risk upon sedation - through the
established preoperatively. 23. The patient/caregiver demonstrates knowledge of the expected responses to the operative or invasive procedure. 20. Patient/Caregiver has reduced anxiety. Interventions- Familiarize with environment and equipment. 21. Potential for fall the patient will move to fall risk upon sedation through the recovery phase. [place] to environment. Call light in reach. Instruct to call for assistance prior to getting up. Non skid footwear on. Bed wheels locked. Bed in lowest position. Side rails up times two.

## 2018-10-15 NOTE — ANESTHESIA POSTPROCEDURE EVALUATION
Department of Anesthesiology  Postprocedure Note    Patient: Carolann Bence  MRN: [id number]  YOB: 1985  Date of evaluation: 10/15/2018    Procedure Summary     Date:  10/15/18 [facility]    Anesthesia Start:  1034 Anesthesia Stop:  9669    Procedure:  RIGHT SHOULDER VIDEO ARTHROSCOPY, DISTAL CLAVICLE EXCISION (Right Shoulder) Diagnosis:  (OSTEOLYSIS OF ACROMIAL RIGHT CLAVICLE)    Surgeon:  Lior Huynh DO Responsible Provider:  Ganga Jackson MD    Anesthesia Type:  general, regional ASA Status:  2        Anesthesia Type: general, regional    Dennis Phase I: Dennis Score: 10    Dennis Phase II: Dennis Score: 10    Last vitals: Reviewed and per EMR flowsheets.        Anesthesia Post Evaluation   Anesthetic Problems: no   Last Vitals:   BP: 119/83 Pulse: 67   Resp: 18 SpO2: 95%   Temp: 97.0F     Cardiovascular System Stable: yes  Respiratory Function: Airway Patent yes  ETT no  Ventilator no  Level of consciousness: awake, alert and oriented  Post-op pain: adequate analgesia  Hydration Adequate: yes  Nausea/Vomiting:no  Other Issues:     Prince Zapien MD

## 2018-10-15 NOTE — ANESTHESIA PROCEDURE NOTES
Peripheral Block    Patient location during procedure: pre-op  Start time: 10/15/2018 9:30 AM  End time: 10/15/2018 9:36 AM  Staffing  Anesthesiologist: Marii Lund  Performed: anesthesiologist   Preanesthetic Checklist  Completed: patient identified, site marked, surgical consent, pre-op evaluation, timeout performed, IV checked, risks and benefits discussed, monitors and equipment checked, anesthesia consent given, oxygen available and patient being monitored  Peripheral Block  Patient position: sitting  Prep: ChloraPrep  Patient monitoring: continuous pulse ox, frequent blood pressure checks and IV access  Block type: Brachial plexus  Laterality: right  Injection technique: single-shot  Procedures: ultrasound guided  Local infiltration: lidocaine  Infiltration strength: 2 %  Dose: 2 mL  Interscalene  Provider prep: sterile gloves  Local infiltration: lidocaine  Needle  Needle gauge: 21 G  Needle length: 10 cm  Needle localization: anatomical landmarks and ultrasound guidance  Assessment  Injection assessment: negative aspiration for heme, no paresthesia on injection and local visualized surrounding nerve on ultrasound  Slow fractionated injection: yes  Hemodynamics: stable  Additional Notes  Patient ID'd and marked. Sedated. Chlorhex prep to neck and shoulder area. Lido 2% 2 ml sc prior to placement of the block needle. Needle advanced under U/S guidance. Bupivacaine 0.25% 25 ml injected slowly in 5 ml aliquots + 5 ml sc in the deltopectoral groove (30 ml total). Patient tolerated well.   Reason for block: post-op pain management and at surgeon's request

## 2018-10-16 DIAGNOSIS — S43.51XS: Primary | ICD-10-CM

## 2018-10-22 DIAGNOSIS — M89.511 OSTEOLYSIS OF ACROMIAL END OF RIGHT CLAVICLE: ICD-10-CM

## 2018-10-22 RX ORDER — OXYCODONE HYDROCHLORIDE AND ACETAMINOPHEN 5; 325 MG/1; MG/1
1 TABLET ORAL EVERY 6 HOURS PRN
Qty: 20 TABLET | Refills: 0 | Status: SHIPPED | OUTPATIENT
Start: 2018-10-22 | End: 2018-10-27

## 2018-10-25 ENCOUNTER — OFFICE VISIT (OUTPATIENT)
Dept: ORTHOPEDIC SURGERY | Age: 33
End: 2018-10-25

## 2018-10-25 VITALS — WEIGHT: 130 LBS | HEIGHT: 67 IN | BODY MASS INDEX: 20.4 KG/M2

## 2018-10-25 DIAGNOSIS — M89.511 OSTEOLYSIS OF ACROMIAL END OF RIGHT CLAVICLE: Primary | ICD-10-CM

## 2018-10-25 PROCEDURE — 99024 POSTOP FOLLOW-UP VISIT: CPT | Performed by: ORTHOPAEDIC SURGERY

## 2018-10-25 RX ORDER — OXYCODONE HYDROCHLORIDE AND ACETAMINOPHEN 5; 325 MG/1; MG/1
1 TABLET ORAL EVERY 6 HOURS PRN
Qty: 20 TABLET | Refills: 0 | Status: CANCELLED | OUTPATIENT
Start: 2018-10-25 | End: 2018-10-30

## 2018-10-29 DIAGNOSIS — S43.101A ACROMIOCLAVICULAR JOINT SEPARATION, RIGHT, INITIAL ENCOUNTER: Primary | ICD-10-CM

## 2018-10-30 RX ORDER — HYDROCODONE BITARTRATE AND ACETAMINOPHEN 5; 325 MG/1; MG/1
1 TABLET ORAL EVERY 6 HOURS PRN
Qty: 28 TABLET | Refills: 0 | Status: SHIPPED | OUTPATIENT
Start: 2018-10-30 | End: 2018-11-06

## 2018-11-06 DIAGNOSIS — S43.101D CLOSED DISLOCATION OF RIGHT ACROMIOCLAVICULAR JOINT, SUBSEQUENT ENCOUNTER: Primary | ICD-10-CM

## 2018-11-06 RX ORDER — HYDROCODONE BITARTRATE AND ACETAMINOPHEN 5; 325 MG/1; MG/1
1 TABLET ORAL EVERY 6 HOURS PRN
Qty: 28 TABLET | Refills: 0 | Status: SHIPPED | OUTPATIENT
Start: 2018-11-06 | End: 2018-11-13 | Stop reason: SDUPTHER

## 2018-11-13 DIAGNOSIS — S43.101D CLOSED DISLOCATION OF RIGHT ACROMIOCLAVICULAR JOINT, SUBSEQUENT ENCOUNTER: ICD-10-CM

## 2018-11-13 RX ORDER — HYDROCODONE BITARTRATE AND ACETAMINOPHEN 5; 325 MG/1; MG/1
1 TABLET ORAL EVERY 6 HOURS PRN
Qty: 28 TABLET | Refills: 0 | Status: SHIPPED | OUTPATIENT
Start: 2018-11-13 | End: 2018-11-20 | Stop reason: SDUPTHER

## 2018-11-20 DIAGNOSIS — S43.101D CLOSED DISLOCATION OF RIGHT ACROMIOCLAVICULAR JOINT, SUBSEQUENT ENCOUNTER: ICD-10-CM

## 2018-11-20 RX ORDER — HYDROCODONE BITARTRATE AND ACETAMINOPHEN 5; 325 MG/1; MG/1
1 TABLET ORAL EVERY 6 HOURS PRN
Qty: 28 TABLET | Refills: 0 | Status: SHIPPED | OUTPATIENT
Start: 2018-11-20 | End: 2018-11-27

## 2019-05-16 ENCOUNTER — HOSPITAL ENCOUNTER (OUTPATIENT)
Age: 34
Discharge: HOME | End: 2019-05-16
Payer: MEDICARE

## 2019-05-16 ENCOUNTER — HOSPITAL ENCOUNTER (OUTPATIENT)
Dept: GENERAL RADIOLOGY | Age: 34
Discharge: HOME | End: 2019-05-16
Payer: MEDICARE

## 2019-05-16 DIAGNOSIS — M54.2 CERVICALGIA: ICD-10-CM

## 2019-05-16 PROCEDURE — 72040 X-RAY EXAM NECK SPINE 2-3 VW: CPT

## 2019-05-21 ENCOUNTER — TELEPHONE (OUTPATIENT)
Dept: ORTHOPEDIC SURGERY | Age: 34
End: 2019-05-21

## 2019-05-21 NOTE — TELEPHONE ENCOUNTER
Dr. Yanna Qureshi was directly sent a referral for patient to be seen for neck. She has approved to see. Patient was referred to Dr. Yanna Qureshi for  NECK  MRI NO  PT NO  MEDICAL HX NEGATIVE  Patient is APPROVED to be seen at this time.  Message sent to schedulers

## 2019-05-28 ENCOUNTER — OFFICE VISIT (OUTPATIENT)
Dept: ORTHOPEDIC SURGERY | Age: 34
End: 2019-05-28
Payer: MEDICARE

## 2019-05-28 VITALS
SYSTOLIC BLOOD PRESSURE: 128 MMHG | BODY MASS INDEX: 19.46 KG/M2 | HEIGHT: 67 IN | HEART RATE: 72 BPM | WEIGHT: 124 LBS | RESPIRATION RATE: 16 BRPM | DIASTOLIC BLOOD PRESSURE: 88 MMHG

## 2019-05-28 DIAGNOSIS — M50.30 DDD (DEGENERATIVE DISC DISEASE), CERVICAL: ICD-10-CM

## 2019-05-28 DIAGNOSIS — S13.9XXA CERVICAL SPRAIN, INITIAL ENCOUNTER: Primary | ICD-10-CM

## 2019-05-28 PROBLEM — K92.1 PASSAGE OF BLOODY STOOLS: Status: ACTIVE | Noted: 2019-05-28

## 2019-05-28 PROBLEM — M79.5 FOREIGN BODY (FB) IN SOFT TISSUE: Status: ACTIVE | Noted: 2019-05-28

## 2019-05-28 PROBLEM — L21.9 SEBORRHEIC DERMATITIS: Status: ACTIVE | Noted: 2019-05-28

## 2019-05-28 PROBLEM — D22.9 NEVUS: Status: ACTIVE | Noted: 2019-05-28

## 2019-05-28 PROBLEM — M54.50 LOW BACK PAIN: Status: ACTIVE | Noted: 2019-05-28

## 2019-05-28 PROBLEM — M25.532 PAIN IN BOTH WRISTS: Status: ACTIVE | Noted: 2019-05-28

## 2019-05-28 PROBLEM — M54.6 PAIN IN THORACIC SPINE: Status: ACTIVE | Noted: 2019-05-28

## 2019-05-28 PROBLEM — M25.531 PAIN IN BOTH WRISTS: Status: ACTIVE | Noted: 2019-05-28

## 2019-05-28 PROBLEM — Z01.818 ENCOUNTER FOR OTHER PREPROCEDURAL EXAMINATION: Status: ACTIVE | Noted: 2019-05-28

## 2019-05-28 PROBLEM — N20.0 NEPHROLITHIASIS: Status: ACTIVE | Noted: 2019-05-28

## 2019-05-28 PROBLEM — Z00.00 ENCOUNTER FOR GENERAL ADULT MEDICAL EXAMINATION WITHOUT ABNORMAL FINDINGS: Status: ACTIVE | Noted: 2019-05-28

## 2019-05-28 PROCEDURE — G8427 DOCREV CUR MEDS BY ELIG CLIN: HCPCS | Performed by: PHYSICIAN ASSISTANT

## 2019-05-28 PROCEDURE — G8420 CALC BMI NORM PARAMETERS: HCPCS | Performed by: PHYSICIAN ASSISTANT

## 2019-05-28 PROCEDURE — 99243 OFF/OP CNSLTJ NEW/EST LOW 30: CPT | Performed by: PHYSICIAN ASSISTANT

## 2019-05-28 RX ORDER — CYCLOBENZAPRINE HCL 10 MG
1 TABLET ORAL
COMMUNITY
Start: 2019-05-16 | End: 2019-08-16 | Stop reason: ALTCHOICE

## 2019-05-28 RX ORDER — FLUOCINONIDE GEL 0.5 MG/G
GEL TOPICAL
COMMUNITY
Start: 2017-11-26 | End: 2020-11-19

## 2019-05-28 RX ORDER — CICLOPIROX 7.7 MG/G
GEL TOPICAL
COMMUNITY
Start: 2017-11-17 | End: 2020-11-19
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
## 2019-06-27 PROBLEM — Z01.818 ENCOUNTER FOR OTHER PREPROCEDURAL EXAMINATION: Status: RESOLVED | Noted: 2019-05-28 | Resolved: 2019-06-27

## 2019-06-27 PROBLEM — Z00.00 ENCOUNTER FOR GENERAL ADULT MEDICAL EXAMINATION WITHOUT ABNORMAL FINDINGS: Status: RESOLVED | Noted: 2019-05-28 | Resolved: 2019-06-27

## 2019-08-11 ENCOUNTER — APPOINTMENT (OUTPATIENT)
Dept: CT IMAGING | Age: 34
End: 2019-08-11
Payer: MEDICARE

## 2019-08-11 ENCOUNTER — APPOINTMENT (OUTPATIENT)
Dept: GENERAL RADIOLOGY | Age: 34
End: 2019-08-11
Payer: MEDICARE

## 2019-08-11 ENCOUNTER — HOSPITAL ENCOUNTER (EMERGENCY)
Age: 34
Discharge: ANOTHER HOSPITAL | End: 2019-08-12
Attending: EMERGENCY MEDICINE
Payer: MEDICARE

## 2019-08-11 DIAGNOSIS — R07.9 CHEST PAIN, UNSPECIFIED TYPE: ICD-10-CM

## 2019-08-11 DIAGNOSIS — N17.9 AKI (ACUTE KIDNEY INJURY) (HCC): ICD-10-CM
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
## 2019-08-11 DIAGNOSIS — R10.84 GENERALIZED ABDOMINAL PAIN: ICD-10-CM

## 2019-08-11 LAB
A/G RATIO: 1.7 (ref 1.1–2.2)
ALBUMIN SERPL-MCNC: 5.5 G/DL (ref 3.4–5)
ALP BLD-CCNC: 79 U/L (ref 40–129)
ALT SERPL-CCNC: 25 U/L (ref 10–40)
ANION GAP SERPL CALCULATED.3IONS-SCNC: 21 MMOL/L (ref 3–16)
AST SERPL-CCNC: 31 U/L (ref 15–37)
BILIRUB SERPL-MCNC: 0.6 MG/DL (ref 0–1)
BUN BLDV-MCNC: 18 MG/DL (ref 7–20)
CALCIUM SERPL-MCNC: 10.2 MG/DL (ref 8.3–10.6)
CHLORIDE BLD-SCNC: 103 MMOL/L (ref 99–110)
CO2: 23 MMOL/L (ref 21–32)
CREAT SERPL-MCNC: 2.4 MG/DL (ref 0.9–1.3)
GFR AFRICAN AMERICAN: 38
GFR NON-AFRICAN AMERICAN: 31
GLOBULIN: 3.3 G/DL
GLUCOSE BLD-MCNC: 124 MG/DL (ref 70–99)
LIPASE: 40 U/L (ref 13–60)
POTASSIUM SERPL-SCNC: 4.2 MMOL/L (ref 3.5–5.1)
SODIUM BLD-SCNC: 147 MMOL/L (ref 136–145)
TOTAL PROTEIN: 8.8 G/DL (ref 6.4–8.2)
TROPONIN: <0.01 NG/ML

## 2019-08-11 PROCEDURE — 93005 ELECTROCARDIOGRAM TRACING: CPT | Performed by: EMERGENCY MEDICINE

## 2019-08-11 PROCEDURE — 2580000003 HC RX 258: Performed by: EMERGENCY MEDICINE

## 2019-08-11 PROCEDURE — 85025 COMPLETE CBC W/AUTO DIFF WBC: CPT

## 2019-08-11 PROCEDURE — 99291 CRITICAL CARE FIRST HOUR: CPT

## 2019-08-11 PROCEDURE — 83690 ASSAY OF LIPASE: CPT

## 2019-08-11 PROCEDURE — 80053 COMPREHEN METABOLIC PANEL: CPT

## 2019-08-11 PROCEDURE — 6360000002 HC RX W HCPCS: Performed by: EMERGENCY MEDICINE

## 2019-08-11 PROCEDURE — 71046 X-RAY EXAM CHEST 2 VIEWS: CPT

## 2019-08-11 PROCEDURE — 84484 ASSAY OF TROPONIN QUANT: CPT

## 2019-08-11 PROCEDURE — 96361 HYDRATE IV INFUSION ADD-ON: CPT

## 2019-08-11 PROCEDURE — 96374 THER/PROPH/DIAG INJ IV PUSH: CPT

## 2019-08-11 RX ORDER — SODIUM CHLORIDE 9 MG/ML
INJECTION, SOLUTION INTRAVENOUS CONTINUOUS
Status: DISCONTINUED | OUTPATIENT
Start: 2019-08-11 | End: 2019-08-12 | Stop reason: HOSPADM

## 2019-08-11 RX ORDER — FENTANYL CITRATE 50 UG/ML
50 INJECTION, SOLUTION INTRAMUSCULAR; INTRAVENOUS
Status: DISCONTINUED | OUTPATIENT
Start: 2019-08-11 | End: 2019-08-12 | Stop reason: HOSPADM

## 2019-08-11 RX ADMIN — FENTANYL CITRATE 50 MCG: 50 INJECTION INTRAMUSCULAR; INTRAVENOUS at 23:38

## 2019-08-11 RX ADMIN — SODIUM CHLORIDE: 9 INJECTION, SOLUTION INTRAVENOUS at 23:38

## 2019-08-11 ASSESSMENT — PAIN SCALES - GENERAL
PAINLEVEL_OUTOF10: 9
PAINLEVEL_OUTOF10: 9

## 2019-08-12 VITALS
TEMPERATURE: 98.5 F | SYSTOLIC BLOOD PRESSURE: 114 MMHG | BODY MASS INDEX: 20.09 KG/M2 | OXYGEN SATURATION: 97 % | HEIGHT: 67 IN | RESPIRATION RATE: 14 BRPM | DIASTOLIC BLOOD PRESSURE: 71 MMHG | HEART RATE: 82 BPM | WEIGHT: 128 LBS

## 2019-08-12 LAB
BASOPHILS ABSOLUTE: 0 K/UL (ref 0–0.2)
BASOPHILS RELATIVE PERCENT: 0 %
EKG ATRIAL RATE: 86 BPM
EKG ATRIAL RATE: 93 BPM
EKG DIAGNOSIS: NORMAL
EKG DIAGNOSIS: NORMAL
EKG P AXIS: 79 DEGREES
EKG P AXIS: 86 DEGREES
EKG P-R INTERVAL: 118 MS
EKG P-R INTERVAL: 124 MS
EKG Q-T INTERVAL: 322 MS
EKG Q-T INTERVAL: 332 MS
EKG QRS DURATION: 78 MS
EKG QRS DURATION: 84 MS
EKG QTC CALCULATION (BAZETT): 397 MS
EKG QTC CALCULATION (BAZETT): 400 MS
EKG R AXIS: 87 DEGREES
EKG R AXIS: 87 DEGREES
EKG T AXIS: 75 DEGREES
EKG T AXIS: 77 DEGREES
EKG VENTRICULAR RATE: 86 BPM
EKG VENTRICULAR RATE: 93 BPM
EOSINOPHILS ABSOLUTE: 0 K/UL (ref 0–0.6)
EOSINOPHILS RELATIVE PERCENT: 0 %
HCT VFR BLD CALC: 45.2 % (ref 40.5–52.5)
HEMATOLOGY PATH CONSULT: YES
HEMOGLOBIN: 15 G/DL (ref 13.5–17.5)
LYMPHOCYTES ABSOLUTE: 1 K/UL (ref 1–5.1)
LYMPHOCYTES RELATIVE PERCENT: 4 %
MCH RBC QN AUTO: 30.7 PG (ref 26–34)
MCHC RBC AUTO-ENTMCNC: 33.2 G/DL (ref 31–36)
MCV RBC AUTO: 92.3 FL (ref 80–100)
MONOCYTES ABSOLUTE: 1.5 K/UL (ref 0–1.3)
MONOCYTES RELATIVE PERCENT: 6 %
NEUTROPHILS ABSOLUTE: 22.6 K/UL (ref 1.7–7.7)
NEUTROPHILS RELATIVE PERCENT: 90 %
PDW BLD-RTO: 13.3 % (ref 12.4–15.4)
PLATELET # BLD: 297 K/UL (ref 135–450)
PLATELET SLIDE REVIEW: ADEQUATE
PMV BLD AUTO: 7.4 FL (ref 5–10.5)
RBC # BLD: 4.89 M/UL (ref 4.2–5.9)
SLIDE REVIEW: ABNORMAL
WBC # BLD: 25.1 K/UL (ref 4–11)

## 2019-08-12 PROCEDURE — 96376 TX/PRO/DX INJ SAME DRUG ADON: CPT

## 2019-08-12 PROCEDURE — 93010 ELECTROCARDIOGRAM REPORT: CPT | Performed by: INTERNAL MEDICINE

## 2019-08-12 PROCEDURE — 96361 HYDRATE IV INFUSION ADD-ON: CPT

## 2019-08-12 PROCEDURE — 6360000002 HC RX W HCPCS: Performed by: EMERGENCY MEDICINE

## 2019-08-12 RX ADMIN — FENTANYL CITRATE 50 MCG: 50 INJECTION INTRAMUSCULAR; INTRAVENOUS at 00:52

## 2019-08-12 ASSESSMENT — PAIN SCALES - GENERAL
PAINLEVEL_OUTOF10: 7
PAINLEVEL_OUTOF10: 8
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
## 2019-08-13 LAB — HEMATOLOGY PATH CONSULT: NORMAL

## 2019-08-16 ENCOUNTER — OFFICE VISIT (OUTPATIENT)
Dept: ORTHOPEDIC SURGERY | Age: 34
End: 2019-08-16
Payer: COMMERCIAL

## 2019-08-16 VITALS
BODY MASS INDEX: 20.1 KG/M2 | WEIGHT: 128.09 LBS | SYSTOLIC BLOOD PRESSURE: 147 MMHG | DIASTOLIC BLOOD PRESSURE: 98 MMHG | RESPIRATION RATE: 14 BRPM | HEART RATE: 54 BPM | HEIGHT: 67 IN
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
## 2019-08-16 DIAGNOSIS — S13.9XXA NECK SPRAIN, INITIAL ENCOUNTER: Primary | ICD-10-CM

## 2019-08-16 DIAGNOSIS — M43.12 SPONDYLOLISTHESIS, CERVICAL REGION: ICD-10-CM

## 2019-08-16 PROCEDURE — 99214 OFFICE O/P EST MOD 30 MIN: CPT | Performed by: PHYSICIAN ASSISTANT

## 2019-08-16 RX ORDER — METHYLPREDNISOLONE 4 MG/1
TABLET ORAL
Qty: 1 KIT | Refills: 0 | Status: SHIPPED | OUTPATIENT
Start: 2019-08-16 | End: 2020-11-19

## 2019-08-16 RX ORDER — HYDROCODONE BITARTRATE AND ACETAMINOPHEN 5; 325 MG/1; MG/1
TABLET ORAL
COMMUNITY
Start: 2019-08-15 | End: 2019-08-16 | Stop reason: ALTCHOICE

## 2019-08-16 RX ORDER — OXYCODONE HYDROCHLORIDE 5 MG/1
5 TABLET ORAL
COMMUNITY
Start: 2019-08-13 | End: 2019-08-16

## 2019-08-16 RX ORDER — HYDROCODONE BITARTRATE AND ACETAMINOPHEN 5; 325 MG/1; MG/1
1 TABLET ORAL 2 TIMES DAILY
Qty: 14 TABLET | Refills: 0 | Status: SHIPPED | OUTPATIENT
Start: 2019-08-16 | End: 2019-08-23

## 2019-08-16 RX ORDER — METHOCARBAMOL 500 MG/1
500 TABLET, FILM COATED ORAL
COMMUNITY
Start: 2019-08-13 | End: 2019-08-16 | Stop reason: ALTCHOICE

## 2019-08-16 RX ORDER — CYCLOBENZAPRINE HCL 10 MG
10 TABLET ORAL NIGHTLY
Qty: 14 TABLET | Refills: 0 | Status: SHIPPED | OUTPATIENT
Start: 2019-08-16 | End: 2019-08-30

## 2019-09-20 ENCOUNTER — TELEPHONE (OUTPATIENT)
Dept: ORTHOPEDIC SURGERY | Age: 34
End: 2019-09-20

## 2019-09-20 NOTE — TELEPHONE ENCOUNTER
SCANNED [facility] MEDICAL RECORDS FOR 8/31/13 TO PRESENT (NEW PATIENT IN 2017) INTO MRO FOR OOD

## 2020-06-21 ENCOUNTER — HOSPITAL ENCOUNTER (EMERGENCY)
Age: 35
Discharge: LEFT BEFORE DISCHARGE | End: 2020-06-21
Payer: MEDICARE

## 2020-11-19 ENCOUNTER — ANESTHESIA EVENT (OUTPATIENT)
Dept: OPERATING ROOM | Age: 35
End: 2020-11-19
Payer: MEDICARE

## 2020-11-19 ENCOUNTER — APPOINTMENT (OUTPATIENT)
Dept: ULTRASOUND IMAGING | Age: 35
End: 2020-11-19
Payer: MEDICARE

## 2020-11-19 ENCOUNTER — ANESTHESIA (OUTPATIENT)
Dept: OPERATING ROOM | Age: 35
End: 2020-11-19
Payer: MEDICARE

## 2020-11-19 ENCOUNTER — HOSPITAL ENCOUNTER (OUTPATIENT)
Age: 35
Setting detail: OUTPATIENT SURGERY
Discharge: HOME | End: 2020-11-19
Attending: FAMILY MEDICINE | Admitting: SURGERY
Payer: MEDICARE

## 2020-11-19 ENCOUNTER — APPOINTMENT (OUTPATIENT)
Dept: GENERAL RADIOLOGY | Age: 35
End: 2020-11-19
Payer: MEDICARE

## 2020-11-19 VITALS
SYSTOLIC BLOOD PRESSURE: 153 MMHG | RESPIRATION RATE: 18 BRPM | BODY MASS INDEX: 19.3 KG/M2 | HEART RATE: 92 BPM | DIASTOLIC BLOOD PRESSURE: 97 MMHG | WEIGHT: 123 LBS | TEMPERATURE: 97 F | OXYGEN SATURATION: 100 % | HEIGHT: 67 IN

## 2020-11-19 VITALS — SYSTOLIC BLOOD PRESSURE: 133 MMHG | DIASTOLIC BLOOD PRESSURE: 80 MMHG | OXYGEN SATURATION: 100 %

## 2020-11-19 PROBLEM — K80.20 SYMPTOMATIC CHOLELITHIASIS: Status: ACTIVE | Noted: 2020-11-19

## 2020-11-19 LAB
A/G RATIO: 1.8 (ref 1.1–2.2)
ALBUMIN SERPL-MCNC: 4.7 G/DL (ref 3.4–5)
ALP BLD-CCNC: 70 U/L (ref 40–129)
ALT SERPL-CCNC: 19 U/L (ref 10–40)
ANION GAP SERPL CALCULATED.3IONS-SCNC: 15 MMOL/L (ref 3–16)
AST SERPL-CCNC: 32 U/L (ref 15–37)
BASOPHILS ABSOLUTE: 0.1 K/UL (ref 0–0.2)
BASOPHILS RELATIVE PERCENT: 0.8 %
BILIRUB SERPL-MCNC: 1.2 MG/DL (ref 0–1)
BILIRUBIN URINE: ABNORMAL
BLOOD, URINE: NEGATIVE
BUN BLDV-MCNC: 14 MG/DL (ref 7–20)
CALCIUM SERPL-MCNC: 9.3 MG/DL (ref 8.3–10.6)
CHLORIDE BLD-SCNC: 104 MMOL/L (ref 99–110)
CLARITY: CLEAR
CO2: 18 MMOL/L (ref 21–32)
COLOR: YELLOW
CREAT SERPL-MCNC: 0.8 MG/DL (ref 0.9–1.3)
EKG ATRIAL RATE: 67 BPM
EKG DIAGNOSIS: NORMAL
EKG P AXIS: 75 DEGREES
EKG P-R INTERVAL: 122 MS
EKG Q-T INTERVAL: 432 MS
EKG QRS DURATION: 88 MS
EKG QTC CALCULATION (BAZETT): 456 MS
EKG R AXIS: 87 DEGREES
EKG T AXIS: 75 DEGREES
EKG VENTRICULAR RATE: 67 BPM
EOSINOPHILS ABSOLUTE: 0 K/UL (ref 0–0.6)
EOSINOPHILS RELATIVE PERCENT: 0.4 %
EPITHELIAL CELLS, UA: ABNORMAL /HPF (ref 0–5)
GFR AFRICAN AMERICAN: >60
GFR NON-AFRICAN AMERICAN: >60
GLOBULIN: 2.6 G/DL
GLUCOSE BLD-MCNC: 104 MG/DL (ref 70–99)
GLUCOSE URINE: NEGATIVE MG/DL
HCT VFR BLD CALC: 41.3 % (ref 40.5–52.5)
HEMOGLOBIN: 14.3 G/DL (ref 13.5–17.5)
KETONES, URINE: >=80 MG/DL
LEUKOCYTE ESTERASE, URINE: NEGATIVE
LIPASE: 34 U/L (ref 13–60)
LYMPHOCYTES ABSOLUTE: 1.7 K/UL (ref 1–5.1)
LYMPHOCYTES RELATIVE PERCENT: 16.8 %
MCH RBC QN AUTO: 31.5 PG (ref 26–34)
MCHC RBC AUTO-ENTMCNC: 34.6 G/DL (ref 31–36)
MCV RBC AUTO: 91.1 FL (ref 80–100)
MICROSCOPIC EXAMINATION: YES
MONOCYTES ABSOLUTE: 1.1 K/UL (ref 0–1.3)
MONOCYTES RELATIVE PERCENT: 11 %
MUCUS: ABNORMAL /LPF
NEUTROPHILS ABSOLUTE: 7 K/UL (ref 1.7–7.7)
NEUTROPHILS RELATIVE PERCENT: 71 %
NITRITE, URINE: NEGATIVE
PDW BLD-RTO: 12.9 % (ref 12.4–15.4)
PH UA: 8.5 (ref 5–8)
PLATELET # BLD: 259 K/UL (ref 135–450)
PMV BLD AUTO: 7.6 FL (ref 5–10.5)
POTASSIUM SERPL-SCNC: 4.5 MMOL/L (ref 3.5–5.1)
PROTEIN UA: ABNORMAL MG/DL
RBC # BLD: 4.54 M/UL (ref 4.2–5.9)
RBC UA: ABNORMAL /HPF (ref 0–4)
SARS-COV-2, NAAT: NOT DETECTED
SODIUM BLD-SCNC: 137 MMOL/L (ref 136–145)
SPECIFIC GRAVITY UA: 1.02 (ref 1–1.03)
TOTAL PROTEIN: 7.3 G/DL (ref 6.4–8.2)
TROPONIN: <0.01 NG/ML
URINE REFLEX TO CULTURE: ABNORMAL
URINE TYPE: ABNORMAL
UROBILINOGEN, URINE: 1 E.U./DL
WBC # BLD: 9.8 K/UL (ref 4–11)
WBC UA: ABNORMAL /HPF (ref 0–5)

## 2020-11-19 PROCEDURE — 83690 ASSAY OF LIPASE: CPT

## 2020-11-19 PROCEDURE — 2580000003 HC RX 258: Performed by: NURSE ANESTHETIST, CERTIFIED REGISTERED

## 2020-11-19 PROCEDURE — 6370000000 HC RX 637 (ALT 250 FOR IP): Performed by: ANESTHESIOLOGY

## 2020-11-19 PROCEDURE — 88304 TISSUE EXAM BY PATHOLOGIST: CPT

## 2020-11-19 PROCEDURE — 84484 ASSAY OF TROPONIN QUANT: CPT

## 2020-11-19 PROCEDURE — 2580000003 HC RX 258: Performed by: FAMILY MEDICINE

## 2020-11-19 PROCEDURE — 96376 TX/PRO/DX INJ SAME DRUG ADON: CPT

## 2020-11-19 PROCEDURE — 99285 EMERGENCY DEPT VISIT HI MDM: CPT

## 2020-11-19 PROCEDURE — 7100000000 HC PACU RECOVERY - FIRST 15 MIN: Performed by: SURGERY

## 2020-11-19 PROCEDURE — 6360000002 HC RX W HCPCS: Performed by: NURSE ANESTHETIST, CERTIFIED REGISTERED

## 2020-11-19 PROCEDURE — 85025 COMPLETE CBC W/AUTO DIFF WBC: CPT

## 2020-11-19 PROCEDURE — S2900 ROBOTIC SURGICAL SYSTEM: HCPCS | Performed by: SURGERY

## 2020-11-19 PROCEDURE — 47562 LAPAROSCOPIC CHOLECYSTECTOMY: CPT | Performed by: SURGERY

## 2020-11-19 PROCEDURE — 2500000003 HC RX 250 WO HCPCS

## 2020-11-19 PROCEDURE — 3600000009 HC SURGERY ROBOT BASE: Performed by: SURGERY

## 2020-11-19 PROCEDURE — 3700000000 HC ANESTHESIA ATTENDED CARE: Performed by: SURGERY

## 2020-11-19 PROCEDURE — 2709999900 HC NON-CHARGEABLE SUPPLY: Performed by: SURGERY

## 2020-11-19 PROCEDURE — 8E0W4CZ ROBOTIC ASSISTED PROCEDURE OF TRUNK REGION, PERCUTANEOUS ENDOSCOPIC APPROACH: ICD-10-PCS | Performed by: SURGERY

## 2020-11-19 PROCEDURE — U0002 COVID-19 LAB TEST NON-CDC: HCPCS

## 2020-11-19 PROCEDURE — 3700000001 HC ADD 15 MINUTES (ANESTHESIA): Performed by: SURGERY

## 2020-11-19 PROCEDURE — 7100000010 HC PHASE II RECOVERY - FIRST 15 MIN: Performed by: SURGERY

## 2020-11-19 PROCEDURE — 6360000002 HC RX W HCPCS: Performed by: SURGERY

## 2020-11-19 PROCEDURE — 7100000001 HC PACU RECOVERY - ADDTL 15 MIN: Performed by: SURGERY

## 2020-11-19 PROCEDURE — 6360000002 HC RX W HCPCS: Performed by: FAMILY MEDICINE

## 2020-11-19 PROCEDURE — 3600000019 HC SURGERY ROBOT ADDTL 15MIN: Performed by: SURGERY

## 2020-11-19 PROCEDURE — 2500000003 HC RX 250 WO HCPCS: Performed by: NURSE ANESTHETIST, CERTIFIED REGISTERED

## 2020-11-19 PROCEDURE — 96375 TX/PRO/DX INJ NEW DRUG ADDON: CPT

## 2020-11-19 PROCEDURE — 96374 THER/PROPH/DIAG INJ IV PUSH: CPT

## 2020-11-19 PROCEDURE — 80053 COMPREHEN METABOLIC PANEL: CPT

## 2020-11-19 PROCEDURE — 2500000003 HC RX 250 WO HCPCS: Performed by: SURGERY

## 2020-11-19 PROCEDURE — 76705 ECHO EXAM OF ABDOMEN: CPT

## 2020-11-19 PROCEDURE — 2580000003 HC RX 258: Performed by: SURGERY

## 2020-11-19 PROCEDURE — 93005 ELECTROCARDIOGRAM TRACING: CPT | Performed by: FAMILY MEDICINE

## 2020-11-19 PROCEDURE — 0FT44ZZ RESECTION OF GALLBLADDER, PERCUTANEOUS ENDOSCOPIC APPROACH: ICD-10-PCS | Performed by: SURGERY

## 2020-11-19 PROCEDURE — 6360000002 HC RX W HCPCS: Performed by: ANESTHESIOLOGY

## 2020-11-19 PROCEDURE — 81001 URINALYSIS AUTO W/SCOPE: CPT

## 2020-11-19 PROCEDURE — 71045 X-RAY EXAM CHEST 1 VIEW: CPT

## 2020-11-19 PROCEDURE — 7100000011 HC PHASE II RECOVERY - ADDTL 15 MIN: Performed by: SURGERY

## 2020-11-19 PROCEDURE — 99221 1ST HOSP IP/OBS SF/LOW 40: CPT | Performed by: SURGERY

## 2020-11-19 PROCEDURE — 93010 ELECTROCARDIOGRAM REPORT: CPT | Performed by: INTERNAL MEDICINE

## 2020-11-19 RX ORDER — DIPHENHYDRAMINE HYDROCHLORIDE 50 MG/ML
INJECTION INTRAMUSCULAR; INTRAVENOUS PRN
Status: DISCONTINUED | OUTPATIENT
Start: 2020-11-19 | End: 2020-11-19 | Stop reason: SDUPTHER

## 2020-11-19 RX ORDER — ROCURONIUM BROMIDE 10 MG/ML
INJECTION, SOLUTION INTRAVENOUS PRN
Status: DISCONTINUED | OUTPATIENT
Start: 2020-11-19 | End: 2020-11-19 | Stop reason: SDUPTHER

## 2020-11-19 RX ORDER — KETOROLAC TROMETHAMINE 30 MG/ML
INJECTION, SOLUTION INTRAMUSCULAR; INTRAVENOUS PRN
Status: DISCONTINUED | OUTPATIENT
Start: 2020-11-19 | End: 2020-11-19 | Stop reason: SDUPTHER

## 2020-11-19 RX ORDER — ACETAMINOPHEN 325 MG/1
650 TABLET ORAL EVERY 6 HOURS PRN
Status: DISCONTINUED | OUTPATIENT
Start: 2020-11-19 | End: 2020-11-19 | Stop reason: HOSPADM

## 2020-11-19 RX ORDER — 0.9 % SODIUM CHLORIDE 0.9 %
1000 INTRAVENOUS SOLUTION INTRAVENOUS ONCE
Status: COMPLETED | OUTPATIENT
Start: 2020-11-19 | End: 2020-11-19

## 2020-11-19 RX ORDER — SODIUM CHLORIDE 9 MG/ML
INJECTION, SOLUTION INTRAVENOUS CONTINUOUS
Status: DISCONTINUED | OUTPATIENT
Start: 2020-11-19 | End: 2020-11-19 | Stop reason: HOSPADM

## 2020-11-19 RX ORDER — PROPOFOL 10 MG/ML
INJECTION, EMULSION INTRAVENOUS PRN
Status: DISCONTINUED | OUTPATIENT
Start: 2020-11-19 | End: 2020-11-19 | Stop reason: SDUPTHER

## 2020-11-19 RX ORDER — GLYCOPYRROLATE 0.2 MG/ML
INJECTION INTRAMUSCULAR; INTRAVENOUS PRN
Status: DISCONTINUED | OUTPATIENT
Start: 2020-11-19 | End: 2020-11-19 | Stop reason: SDUPTHER

## 2020-11-19 RX ORDER — NEOSTIGMINE METHYLSULFATE 5 MG/5 ML
SYRINGE (ML) INTRAVENOUS PRN
Status: DISCONTINUED | OUTPATIENT
Start: 2020-11-19 | End: 2020-11-19 | Stop reason: SDUPTHER

## 2020-11-19 RX ORDER — LIDOCAINE HYDROCHLORIDE 20 MG/ML
INJECTION, SOLUTION INFILTRATION; PERINEURAL PRN
Status: DISCONTINUED | OUTPATIENT
Start: 2020-11-19 | End: 2020-11-19 | Stop reason: SDUPTHER

## 2020-11-19 RX ORDER — ONDANSETRON 2 MG/ML
4 INJECTION INTRAMUSCULAR; INTRAVENOUS EVERY 6 HOURS PRN
Status: DISCONTINUED | OUTPATIENT
Start: 2020-11-19 | End: 2020-11-19 | Stop reason: HOSPADM

## 2020-11-19 RX ORDER — DEXAMETHASONE SODIUM PHOSPHATE 4 MG/ML
INJECTION, SOLUTION INTRA-ARTICULAR; INTRALESIONAL; INTRAMUSCULAR; INTRAVENOUS; SOFT TISSUE PRN
Status: DISCONTINUED | OUTPATIENT
Start: 2020-11-19 | End: 2020-11-19 | Stop reason: SDUPTHER

## 2020-11-19 RX ORDER — BUPIVACAINE HYDROCHLORIDE 5 MG/ML
INJECTION, SOLUTION EPIDURAL; INTRACAUDAL PRN
Status: DISCONTINUED | OUTPATIENT
Start: 2020-11-19 | End: 2020-11-19 | Stop reason: ALTCHOICE

## 2020-11-19 RX ORDER — TIZANIDINE 4 MG/1
4 TABLET ORAL EVERY 6 HOURS PRN
COMMUNITY

## 2020-11-19 RX ORDER — KETOROLAC TROMETHAMINE 30 MG/ML
30 INJECTION, SOLUTION INTRAMUSCULAR; INTRAVENOUS ONCE
Status: COMPLETED | OUTPATIENT
Start: 2020-11-19 | End: 2020-11-19

## 2020-11-19 RX ORDER — INDOCYANINE GREEN AND WATER 25 MG
5 KIT INJECTION ONCE
Status: COMPLETED | OUTPATIENT
Start: 2020-11-19 | End: 2020-11-19

## 2020-11-19 RX ORDER — MEPERIDINE HYDROCHLORIDE 25 MG/ML
12.5 INJECTION INTRAMUSCULAR; INTRAVENOUS; SUBCUTANEOUS EVERY 5 MIN PRN
Status: DISCONTINUED | OUTPATIENT
Start: 2020-11-19 | End: 2020-11-19 | Stop reason: HOSPADM

## 2020-11-19 RX ORDER — SUCCINYLCHOLINE CHLORIDE 20 MG/ML
INJECTION INTRAMUSCULAR; INTRAVENOUS PRN
Status: DISCONTINUED | OUTPATIENT
Start: 2020-11-19 | End: 2020-11-19 | Stop reason: SDUPTHER

## 2020-11-19 RX ORDER — SODIUM CHLORIDE 0.9 % (FLUSH) 0.9 %
10 SYRINGE (ML) INJECTION EVERY 12 HOURS SCHEDULED
Status: DISCONTINUED | OUTPATIENT
Start: 2020-11-19 | End: 2020-11-19 | Stop reason: HOSPADM

## 2020-11-19 RX ORDER — FENTANYL CITRATE 50 UG/ML
INJECTION, SOLUTION INTRAMUSCULAR; INTRAVENOUS PRN
Status: DISCONTINUED | OUTPATIENT
Start: 2020-11-19 | End: 2020-11-19 | Stop reason: SDUPTHER

## 2020-11-19 RX ORDER — PROMETHAZINE HYDROCHLORIDE 25 MG/1
12.5 TABLET ORAL EVERY 6 HOURS PRN
Status: DISCONTINUED | OUTPATIENT
Start: 2020-11-19 | End: 2020-11-19 | Stop reason: HOSPADM

## 2020-11-19 RX ORDER — HYDRALAZINE HYDROCHLORIDE 20 MG/ML
5 INJECTION INTRAMUSCULAR; INTRAVENOUS EVERY 30 MIN PRN
Status: DISCONTINUED | OUTPATIENT
Start: 2020-11-19 | End: 2020-11-19 | Stop reason: HOSPADM

## 2020-11-19 RX ORDER — SODIUM CHLORIDE 0.9 % (FLUSH) 0.9 %
10 SYRINGE (ML) INJECTION PRN
Status: DISCONTINUED | OUTPATIENT
Start: 2020-11-19 | End: 2020-11-19 | Stop reason: HOSPADM

## 2020-11-19 RX ORDER — INDOCYANINE GREEN AND WATER 25 MG
KIT INJECTION
Status: DISCONTINUED
Start: 2020-11-19 | End: 2020-11-19 | Stop reason: HOSPADM

## 2020-11-19 RX ORDER — LABETALOL HYDROCHLORIDE 5 MG/ML
5 INJECTION, SOLUTION INTRAVENOUS
Status: DISCONTINUED | OUTPATIENT
Start: 2020-11-19 | End: 2020-11-19 | Stop reason: HOSPADM

## 2020-11-19 RX ORDER — OXYCODONE HYDROCHLORIDE AND ACETAMINOPHEN 5; 325 MG/1; MG/1
1 TABLET ORAL EVERY 6 HOURS PRN
Qty: 20 TABLET | Refills: 0 | Status: ON HOLD | OUTPATIENT
Start: 2020-11-19 | End: 2020-11-22 | Stop reason: HOSPADM

## 2020-11-19 RX ORDER — ONDANSETRON 2 MG/ML
4 INJECTION INTRAMUSCULAR; INTRAVENOUS EVERY 30 MIN PRN
Status: DISCONTINUED | OUTPATIENT
Start: 2020-11-19 | End: 2020-11-19 | Stop reason: HOSPADM

## 2020-11-19 RX ORDER — DIPHENHYDRAMINE HYDROCHLORIDE 50 MG/ML
6.25 INJECTION INTRAMUSCULAR; INTRAVENOUS
Status: DISCONTINUED | OUTPATIENT
Start: 2020-11-19 | End: 2020-11-19 | Stop reason: HOSPADM

## 2020-11-19 RX ORDER — INDOCYANINE GREEN AND WATER 25 MG
KIT INJECTION
Status: COMPLETED
Start: 2020-11-19 | End: 2020-11-19

## 2020-11-19 RX ORDER — ONDANSETRON 2 MG/ML
INJECTION INTRAMUSCULAR; INTRAVENOUS PRN
Status: DISCONTINUED | OUTPATIENT
Start: 2020-11-19 | End: 2020-11-19 | Stop reason: SDUPTHER

## 2020-11-19 RX ORDER — OXYCODONE HYDROCHLORIDE AND ACETAMINOPHEN 5; 325 MG/1; MG/1
2 TABLET ORAL PRN
Status: COMPLETED | OUTPATIENT
Start: 2020-11-19 | End: 2020-11-19

## 2020-11-19 RX ORDER — OXYCODONE HYDROCHLORIDE AND ACETAMINOPHEN 5; 325 MG/1; MG/1
1 TABLET ORAL PRN
Status: COMPLETED | OUTPATIENT
Start: 2020-11-19 | End: 2020-11-19

## 2020-11-19 RX ORDER — HEPARIN SODIUM 5000 [USP'U]/ML
5000 INJECTION, SOLUTION INTRAVENOUS; SUBCUTANEOUS ONCE
Status: DISCONTINUED | OUTPATIENT
Start: 2020-11-19 | End: 2020-11-19 | Stop reason: HOSPADM

## 2020-11-19 RX ORDER — PROMETHAZINE HYDROCHLORIDE 25 MG/ML
25 INJECTION, SOLUTION INTRAMUSCULAR; INTRAVENOUS ONCE
Status: COMPLETED | OUTPATIENT
Start: 2020-11-19 | End: 2020-11-19

## 2020-11-19 RX ORDER — MORPHINE SULFATE 4 MG/ML
4 INJECTION, SOLUTION INTRAMUSCULAR; INTRAVENOUS EVERY 30 MIN PRN
Status: COMPLETED | OUTPATIENT
Start: 2020-11-19 | End: 2020-11-19

## 2020-11-19 RX ORDER — SODIUM CHLORIDE, SODIUM LACTATE, POTASSIUM CHLORIDE, CALCIUM CHLORIDE 600; 310; 30; 20 MG/100ML; MG/100ML; MG/100ML; MG/100ML
INJECTION, SOLUTION INTRAVENOUS CONTINUOUS PRN
Status: DISCONTINUED | OUTPATIENT
Start: 2020-11-19 | End: 2020-11-19 | Stop reason: SDUPTHER

## 2020-11-19 RX ADMIN — GLYCOPYRROLATE 0.4 MG: 0.2 INJECTION, SOLUTION INTRAMUSCULAR; INTRAVENOUS at 11:44

## 2020-11-19 RX ADMIN — Medication 0.5 MG: at 12:20

## 2020-11-19 RX ADMIN — MORPHINE SULFATE 4 MG: 4 INJECTION INTRAVENOUS at 03:16

## 2020-11-19 RX ADMIN — FENTANYL CITRATE 50 MCG: 50 INJECTION INTRAMUSCULAR; INTRAVENOUS at 11:01

## 2020-11-19 RX ADMIN — ONDANSETRON 4 MG: 2 INJECTION, SOLUTION INTRAMUSCULAR; INTRAVENOUS at 11:16

## 2020-11-19 RX ADMIN — ROCURONIUM BROMIDE 40 MG: 10 INJECTION, SOLUTION INTRAVENOUS at 11:05

## 2020-11-19 RX ADMIN — ONDANSETRON HYDROCHLORIDE 4 MG: 2 INJECTION, SOLUTION INTRAMUSCULAR; INTRAVENOUS at 13:01

## 2020-11-19 RX ADMIN — MORPHINE SULFATE 4 MG: 4 INJECTION INTRAVENOUS at 02:49

## 2020-11-19 RX ADMIN — INDOCYANINE GREEN AND WATER 5 MG: KIT at 10:24

## 2020-11-19 RX ADMIN — KETOROLAC TROMETHAMINE 30 MG: 30 INJECTION, SOLUTION INTRAMUSCULAR at 02:49

## 2020-11-19 RX ADMIN — HYDROMORPHONE HYDROCHLORIDE 0.5 MG: 1 INJECTION, SOLUTION INTRAMUSCULAR; INTRAVENOUS; SUBCUTANEOUS at 08:15

## 2020-11-19 RX ADMIN — HYDROMORPHONE HYDROCHLORIDE 0.5 MG: 1 INJECTION, SOLUTION INTRAMUSCULAR; INTRAVENOUS; SUBCUTANEOUS at 12:36

## 2020-11-19 RX ADMIN — SODIUM CHLORIDE 1000 ML: 9 INJECTION, SOLUTION INTRAVENOUS at 05:34

## 2020-11-19 RX ADMIN — PROPOFOL 200 MG: 10 INJECTION, EMULSION INTRAVENOUS at 11:05

## 2020-11-19 RX ADMIN — ONDANSETRON HYDROCHLORIDE 4 MG: 2 INJECTION, SOLUTION INTRAMUSCULAR; INTRAVENOUS at 08:16

## 2020-11-19 RX ADMIN — FENTANYL CITRATE 100 MCG: 50 INJECTION INTRAMUSCULAR; INTRAVENOUS at 11:15

## 2020-11-19 RX ADMIN — LIDOCAINE HYDROCHLORIDE 50 MG: 20 INJECTION, SOLUTION INFILTRATION; PERINEURAL at 11:05

## 2020-11-19 RX ADMIN — SODIUM CHLORIDE 1000 ML: 9 INJECTION, SOLUTION INTRAVENOUS at 02:49

## 2020-11-19 RX ADMIN — DEXAMETHASONE SODIUM PHOSPHATE 8 MG: 4 INJECTION, SOLUTION INTRAMUSCULAR; INTRAVENOUS at 11:18

## 2020-11-19 RX ADMIN — PROMETHAZINE HYDROCHLORIDE 25 MG: 25 INJECTION INTRAMUSCULAR; INTRAVENOUS at 03:12

## 2020-11-19 RX ADMIN — Medication 3 MG: at 11:44

## 2020-11-19 RX ADMIN — FAMOTIDINE 20 MG: 10 INJECTION INTRAVENOUS at 08:16

## 2020-11-19 RX ADMIN — FENTANYL CITRATE 50 MCG: 50 INJECTION INTRAMUSCULAR; INTRAVENOUS at 11:05

## 2020-11-19 RX ADMIN — MORPHINE SULFATE 4 MG: 4 INJECTION INTRAVENOUS at 03:56

## 2020-11-19 RX ADMIN — HYDROMORPHONE HYDROCHLORIDE 0.5 MG: 1 INJECTION, SOLUTION INTRAMUSCULAR; INTRAVENOUS; SUBCUTANEOUS at 12:15

## 2020-11-19 RX ADMIN — SODIUM CHLORIDE, POTASSIUM CHLORIDE, SODIUM LACTATE AND CALCIUM CHLORIDE: 600; 310; 30; 20 INJECTION, SOLUTION INTRAVENOUS at 11:01

## 2020-11-19 RX ADMIN — ENOXAPARIN SODIUM 40 MG: 40 INJECTION SUBCUTANEOUS at 08:16

## 2020-11-19 RX ADMIN — HYDROMORPHONE HYDROCHLORIDE 0.5 MG: 1 INJECTION, SOLUTION INTRAMUSCULAR; INTRAVENOUS; SUBCUTANEOUS at 12:09

## 2020-11-19 RX ADMIN — SODIUM CHLORIDE: 9 INJECTION, SOLUTION INTRAVENOUS at 08:15

## 2020-11-19 RX ADMIN — DIPHENHYDRAMINE HYDROCHLORIDE 12.5 MG: 50 INJECTION, SOLUTION INTRAMUSCULAR; INTRAVENOUS at 11:13

## 2020-11-19 RX ADMIN — PIPERACILLIN SODIUM AND TAZOBACTAM SODIUM 3.38 G: 3; .375 INJECTION, POWDER, LYOPHILIZED, FOR SOLUTION INTRAVENOUS at 05:34

## 2020-11-19 RX ADMIN — SUCCINYLCHOLINE CHLORIDE 140 MG: 20 INJECTION, SOLUTION INTRAMUSCULAR; INTRAVENOUS at 11:05

## 2020-11-19 RX ADMIN — KETOROLAC TROMETHAMINE 30 MG: 30 INJECTION, SOLUTION INTRAMUSCULAR at 11:44

## 2020-11-19 RX ADMIN — OXYCODONE HYDROCHLORIDE AND ACETAMINOPHEN 2 TABLET: 5; 325 TABLET ORAL at 13:01

## 2020-11-19 ASSESSMENT — PULMONARY FUNCTION TESTS
PIF_VALUE: 20
PIF_VALUE: 1
PIF_VALUE: 13
PIF_VALUE: 16
PIF_VALUE: 1
PIF_VALUE: 22
PIF_VALUE: 24
PIF_VALUE: 13
PIF_VALUE: 0
PIF_VALUE: 20
PIF_VALUE: 3
PIF_VALUE: 22
PIF_VALUE: 13
PIF_VALUE: 21
PIF_VALUE: 17
PIF_VALUE: 3
PIF_VALUE: 12
PIF_VALUE: 12
PIF_VALUE: 1
PIF_VALUE: 4
PIF_VALUE: 12
PIF_VALUE: 1
PIF_VALUE: 18
PIF_VALUE: 2
PIF_VALUE: 21
PIF_VALUE: 23
PIF_VALUE: 2
PIF_VALUE: 22
PIF_VALUE: 22
PIF_VALUE: 0
PIF_VALUE: 21
PIF_VALUE: 1
PIF_VALUE: 16
PIF_VALUE: 22
PIF_VALUE: 18
PIF_VALUE: 2
PIF_VALUE: 12
PIF_VALUE: 4
PIF_VALUE: 12
PIF_VALUE: 3
PIF_VALUE: 1
PIF_VALUE: 18
PIF_VALUE: 1
PIF_VALUE: 2
PIF_VALUE: 14
PIF_VALUE: 1
PIF_VALUE: 20
PIF_VALUE: 12
PIF_VALUE: 11
PIF_VALUE: 20
PIF_VALUE: 20
PIF_VALUE: 14
PIF_VALUE: 13
PIF_VALUE: 18
PIF_VALUE: 13
PIF_VALUE: 4
PIF_VALUE: 1
PIF_VALUE: 20
PIF_VALUE: 12
PIF_VALUE: 13
PIF_VALUE: 0
PIF_VALUE: 3
PIF_VALUE: 1
PIF_VALUE: 3
PIF_VALUE: 0
PIF_VALUE: 4
PIF_VALUE: 2
PIF_VALUE: 0
PIF_VALUE: 12
PIF_VALUE: 12
PIF_VALUE: 22

## 2020-11-19 ASSESSMENT — PAIN SCALES - GENERAL
PAINLEVEL_OUTOF10: 6
PAINLEVEL_OUTOF10: 8
PAINLEVEL_OUTOF10: 7
PAINLEVEL_OUTOF10: 9
PAINLEVEL_OUTOF10: 7
PAINLEVEL_OUTOF10: 9
PAINLEVEL_OUTOF10: 7
PAINLEVEL_OUTOF10: 10
PAINLEVEL_OUTOF10: 7

## 2020-11-19 ASSESSMENT — PAIN DESCRIPTION - LOCATION
LOCATION: ABDOMEN
LOCATION: ABDOMEN
LOCATION: RIB CAGE

## 2020-11-19 ASSESSMENT — PAIN DESCRIPTION - PAIN TYPE
TYPE: SURGICAL PAIN
TYPE: SURGICAL PAIN

## 2020-11-19 NOTE — ANESTHESIA PRE PROCEDURE
Department of Anesthesiology  Preprocedure Note       Name:  Catrachito Segura   Age:  28 y.o.  :  1985                                          MRN:  [id number]         Date:  2020      Surgeon: Milla Joiner):  Joaquim Anne MD    Procedure: Procedure(s):  ROBOTIC CHOLECYSTECTOMY, POSSIBLE OPEN PROCEDURE    Medications prior to admission:   Prior to Admission medications    Medication Sig Start Date End Date Taking?  Authorizing Provider   tiZANidine (ZANAFLEX) 4 MG tablet Take 4 mg by mouth every 6 hours as needed   Yes Historical Provider, MD SANTIZOOLIN  (90 Base) MCG/ACT inhaler  19   Historical Provider, MD       Current medications:    Current Facility-Administered Medications   Medication Dose Route Frequency Provider Last Rate Last Dose    sodium chloride flush 0.9 % injection 10 mL  10 mL Intravenous 2 times per day Chanda Joaquin MD        sodium chloride flush 0.9 % injection 10 mL  10 mL Intravenous PRN Chanda Joaquin MD        [facility]) tablet 12.5 mg  12.5 mg Oral Q6H PRN Chanda Joaquin MD        Or    ondansetron [facility]) injection 4 mg  4 mg Intravenous Q6H PRN Chanda Jaoquin MD   4 mg at 20 0816    enoxaparin (LOVENOX) injection 40 mg  40 mg Subcutaneous Daily Chanda Joaquin MD   40 mg at 20 0816    0.9 % sodium chloride infusion   Intravenous Continuous Chanda Joaquin  mL/hr at 20 0815      HYDROmorphone (DILAUDID) injection 0.5 mg  0.5 mg Intravenous Q3H PRN Chanda Joaquin MD   0.5 mg at 20 0815    acetaminophen (TYLENOL) tablet 650 mg  650 mg Oral Q6H PRN Chanda Joaquin MD        famotidine (PEPCID) injection 20 mg  20 mg Intravenous BID Chanda Joaquin MD   20 mg at 20 0816    [Held by provider] heparin (porcine) injection 5,000 Units  5,000 Units Subcutaneous Once Joaquim Anne MD        indocyanine green (IC-GREEN) 25 MG syringe             sterile water injection             HYDROmorphone (DILAUDID) injection 0.5 mg  0.5 mg Intravenous Q10 Min PRN Mera Christina MD        HYDROmorphone (DILAUDID) injection 0.5 mg  0.5 mg Intravenous Q5 Min PRN Mera Christina MD        oxyCODONE-acetaminophen (PERCOCET) 5-325 MG per tablet 1 tablet  1 tablet Oral PRN Mera Christina MD        Or    oxyCODONE-acetaminophen (PERCOCET) 5-325 MG per tablet 2 tablet  2 tablet Oral PRN Mera Christina MD        diphenhydrAMINE (BENADRYL) injection 6.25 mg  6.25 mg Intravenous Once PRN Mera Christina MD        ondansetron [facility]) injection 4 mg  4 mg Intravenous Q30 Min PRN Mera Christina MD        labetalol (NORMODYNE;TRANDATE) injection 5 mg  5 mg Intravenous Q15 Min PRN Mera Christina MD        hydrALAZINE (APRESOLINE) injection 5 mg  5 mg Intravenous Q30 Min PRN Mera Christina MD        meperidine (DEMEROL) injection 12.5 mg  12.5 mg Intravenous Q5 Min PRN Mera Christina MD           Allergies:  No Known Allergies    Problem List:    Patient Active Problem List   Diagnosis Code    Knee sprain S83. 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 9XXA, S16. 1XXA    Nephrolithiasis N20.0    Nevus D22.9    Pain in both wrists M25.531, M25.532    Pain in thoracic spine M54.6    Passage of bloody stools K92.1    Seborrheic dermatitis L21.9    Symptomatic cholelithiasis K80.20       Past Medical History:        Diagnosis Date    Kidney stone     Polyp of colon        Past Surgical History:        Procedure Laterality Date    COLONOSCOPY  06/05/2017    polyps    HAND SURGERY  2009    right    [place] SHOULDER SCOPE BONE SHAVING Right 10/15/2018    RIGHT SHOULDER VIDEO ARTHROSCOPY, DISTAL CLAVICLE EXCISION performed by Miky Noe DO at [facility] Right 10/15/2018    TOE SURGERY Left 07/03/2017    matrixectomy fibular schaeffer    WISDOM TOOTH EXTRACTION         Social History:    Social History     Tobacco Use    Smoking status: Former Smoker     Packs/day: 0.50     Types: Cigarettes     Last attempt to quit: 2/18/2017     Years since quitting: 3.7    Smokeless tobacco: Never Used   Substance Use Topics    Alcohol use: No                                Counseling given: Not Answered      Vital Signs (Current):   Vitals:    11/19/20 0723 11/19/20 0810 11/19/20 0825 11/19/20 0830   BP:       Pulse:       Resp:       Temp:       TempSrc:       SpO2: 100% 100% 98% 98%   Weight:       Height:                                                  BP Readings from Last 3 Encounters:   11/19/20 (!) 142/111   08/16/19 (!) 147/98   08/12/19 114/71       NPO Status: Time of last liquid consumption: 0100                        Time of last solid consumption: 0100                        Date of last liquid consumption: 11/18/20                        Date of last solid food consumption: 11/18/20    BMI:   Wt Readings from Last 3 Encounters:   11/19/20 123 lb (55.8 kg)   08/16/19 128 lb 1.4 oz (58.1 kg)   08/11/19 128 lb (58.1 kg)     Body mass index is 19.26 kg/m².     CBC:   Lab Results   Component Value Date    WBC 9.8 11/19/2020    RBC 4.54 11/19/2020    HGB 14.3 11/19/2020    HCT 41.3 11/19/2020    MCV 91.1 11/19/2020    RDW 12.9 11/19/2020     11/19/2020       CMP:   Lab Results   Component Value Date     11/19/2020    K 4.5 11/19/2020    K 4.6 04/13/2018     11/19/2020    CO2 18 11/19/2020    BUN 14 11/19/2020    CREATININE 0.8 11/19/2020    GFRAA >60 11/19/2020    GFRAA >60 05/25/2013    AGRATIO 1.8 11/19/2020    LABGLOM >60 11/19/2020    GLUCOSE 104 11/19/2020    PROT 7.3 11/19/2020    PROT 8.4 02/27/2010    CALCIUM 9.3 11/19/2020    BILITOT 1.2 11/19/2020    ALKPHOS 70 11/19/2020    AST 32 11/19/2020    ALT 19 11/19/2020       POC Tests: No results for input(s): POCGLU, POCNA, POCK, POCCL, POCBUN, POCHEMO, POCHCT in the last 72 hours. Coags:   Lab Results   Component Value Date    PROTIME 12.3 10/18/2016    INR 1.08 10/18/2016    APTT 33.2 10/18/2016       HCG (If Applicable): No results found for: PREGTESTUR, PREGSERUM, HCG, HCGQUANT     ABGs: No results found for: PHART, PO2ART, FBO8MVH, IJF8JLW, BEART, W7MAWHTY     Type & Screen (If Applicable):  No results found for: LABABO, LABRH    Drug/Infectious Status (If Applicable):  No results found for: HIV, HEPCAB    COVID-19 Screening (If Applicable):   Lab Results   Component Value Date    COVID19 Not Detected 11/19/2020         Anesthesia Evaluation  Patient summary reviewed and Nursing notes reviewed no history of anesthetic complications:   Airway: Mallampati: II     Neck ROM: full   Dental:          Pulmonary:                              Cardiovascular:                      Neuro/Psych:               GI/Hepatic/Renal:             Endo/Other:                     Abdominal:           Vascular:                                        Anesthesia Plan      general     ASA 2     (Medications & allergies reviewed  All available lab & EKG data reviewed)  Induction: intravenous. Anesthetic plan and risks discussed with patient. Plan discussed with CRNA.                   Rich Matute MD   11/19/2020

## 2020-11-19 NOTE — ANESTHESIA POSTPROCEDURE EVALUATION
Department of Anesthesiology  Postprocedure Note    Patient: Juan J Galvez  MRN: [id number]  YOB: 1985  Date of evaluation: 11/19/2020  Time:  3:47 PM     Procedure Summary     Date:  11/19/20 Room / Location:  [address] [place] [address] / [facility]    Anesthesia Start:  1101 Anesthesia Stop:  1214    Procedure:  ROBOTIC CHOLECYSTECTOMY (N/A Abdomen) Diagnosis:  (CHOLECYSTITIS)    Surgeon:  Kristian Snyder MD Responsible Provider:  Ted Mercado MD    Anesthesia Type:  general ASA Status:  2          Anesthesia Type: general    Dennis Phase I: Dennis Score: 10    Dennis Phase II: Dennis Score: 10    Last vitals: Reviewed and per EMR flowsheets.        Anesthesia Post Evaluation    Comments: Postoperative Anesthesia Note    Name:    Juan J Galvez  MRN:      [id number]    Patient Vitals in the past 12 hrs:  11/19/20 1319, BP:(!) 153/97, Pulse:92, Resp:18, SpO2:100 %  11/19/20 1300, BP:(!) 164/96, Temp:97 °F (36.1 °C), Temp src:Temporal, Pulse:99, Resp:18, SpO2:100 %  11/19/20 1245, BP:(!) 170/106, Pulse:99, Resp:19, SpO2:100 %  11/19/20 1230, BP:(!) 169/109, Pulse:97, Resp:20, SpO2:100 %  11/19/20 1225, BP:(!) 160/110, Pulse:96, Resp:27, SpO2:100 %  11/19/20 1221, BP:(!) 160/110, Pulse:97, Resp:29, SpO2:100 %  11/19/20 1209, BP:(!) 160/110, Temp:98.2 °F (36.8 °C), Pulse:92, Resp:14, SpO2:100 %  11/19/20 0830, SpO2:98 %  11/19/20 0825, SpO2:98 %  11/19/20 0810, SpO2:100 %  11/19/20 0723, SpO2:100 %  11/19/20 0706, BP:(!) 142/111, SpO2:100 %  11/19/20 0615, Pulse:64, Resp:18, SpO2:100 %  11/19/20 0506, BP:(!) 142/86, Pulse:78, Resp:18, SpO2:99 %  11/19/20 0406, BP:138/88, Pulse:75, Resp:18, SpO2:99 %     LABS:    CBC  Lab Results       Component                Value               Date/Time                  WBC                      9.8                 11/19/2020 02:44 AM        HGB                      14.3                11/19/2020 02:44 AM        HCT                      41.3 11/19/2020 02:44 AM        PLT                      259                 11/19/2020 02:44 AM   RENAL  Lab Results       Component                Value               Date/Time                  NA                       137                 11/19/2020 02:44 AM        K                        4.5                 11/19/2020 02:44 AM        K                        4.6                 04/13/2018 04:20 PM        CL                       104                 11/19/2020 02:44 AM        CO2                      18 (L)              11/19/2020 02:44 AM        BUN                      14                  11/19/2020 02:44 AM        CREATININE               0.8 (L)             11/19/2020 02:44 AM        GLUCOSE                  104 (H)             11/19/2020 02:44 AM   COAGS  Lab Results       Component                Value               Date/Time                  PROTIME                  12.3                10/18/2016 09:30 AM        INR                      1.08                10/18/2016 09:30 AM        APTT                     33.2                10/18/2016 09:30 AM     Intake & Output:  No intake/output data recorded. Nausea & Vomiting:  No    Level of Consciousness:  Awake    Pain Assessment:  Adequate analgesia    Anesthesia Complications:  No apparent anesthetic complications    SUMMARY      Vital signs stable  OK to discharge from Stage I post anesthesia care.   Care transferred from Anesthesiology department on discharge from perioperative area

## 2020-11-19 NOTE — PROGRESS NOTES
Assessment unchanged from previous. Verbalizes understanding of discharge instructions and written instructions also given to patient. Questions and concerns addressed at this time. Denies any needs at this time. IV d/c'd. Pt discharged via wheelchair to car in good condition. All personal belongings returned to pt. Discharge instructions gone over with and given to pt's brother a long with prescription.

## 2020-11-19 NOTE — BRIEF OP NOTE
Brief Postoperative Note      Patient: Manny Moran  YOB: 1985  MRN: [id number]    Date of Procedure: 11/19/2020    Pre-Op Diagnosis: CHOLECYSTITIS    Post-Op Diagnosis: Same       Procedure(s):  ROBOTIC CHOLECYSTECTOMY, POSSIBLE OPEN PROCEDURE    Surgeon(s):  Barron Aggarwal MD    Assistant:  Surgical Assistant: Tracy Ventura    Anesthesia: General    Estimated Blood Loss (mL): Minimal    Complications: None    Specimens:   ID Type Source Tests Collected by Time Destination   A : GALLBLADDER AND CONTENTS Tissue Gallbladder SURGICAL PATHOLOGY Barron Aggarwal MD 11/19/2020 1135        Implants:  * No implants in log *      Drains: * No LDAs found *    Findings: as above    Electronically signed by Judy Monsalve MD on 11/19/2020 at 11:43 AM

## 2020-11-19 NOTE — ED PROVIDER NOTES
Triage Chief Complaint:   Chest Pain (pt states last 4-6 hours, having shooting pain that goes up under rib cage on the right side)    [place]:  Alejandra Uribe is a 28 y.o. male that presents with 4 to 6 hours of right upper quadrant pain that radiates to his right shoulder and through to his back. Patient with associated nausea. No shortness of breath. No diarrhea constipation. No fevers or chills. No coryza runny nose cough or congestion. He denies dysuria and hematuria.     ROS:  General:  No fevers, no chills, no weakness  Eyes:  No recent vison changes, no discharge  ENT:  No sore throat, no nasal congestion, no hearing changes  Cardiovascular: As above  Respiratory:  No shortness of breath, no cough, no wheezing  Gastrointestinal: As above  Musculoskeletal:  No muscle pain, no joint pain  Skin:  No rash, no pruritis, no easy bruising  Neurologic:  No speech problems, no headache, no extremity numbness, no extremity tingling, no extremity weakness  Psychiatric:  No anxiety, no hallucinations or delusions, no suicidal or homicidal ideation  Genitourinary:  No dysuria, no hematuria  Endocrine:  No unexpected weight gain, no unexpected weight loss  Extremities:  no edema, no pain    Past Medical History:   Diagnosis Date    Kidney stone     Polyp of colon      Past Surgical History:   Procedure Laterality Date    COLONOSCOPY  06/05/2017    polyps    HAND SURGERY  2009    right    [place] SHOULDER SCOPE BONE SHAVING Right 10/15/2018    RIGHT SHOULDER VIDEO ARTHROSCOPY, DISTAL CLAVICLE EXCISION performed by Yin Price DO at [facility] Right 10/15/2018    TOE SURGERY Left 07/03/2017    matrixectomy fibular schaeffer    WISDOM TOOTH EXTRACTION       Family History   Problem Relation Age of Onset    Arthritis Other     Asthma Other     Cancer Other     Diabetes Other     High Blood Pressure Other     Stroke Other      Social History     Socioeconomic History    Marital status: Single     Spouse name: Not on file    Number of children: Not on file    Years of education: Not on file    Highest education level: Not on file   Occupational History    Not on file   Social Needs    Financial resource strain: Not on file    Food insecurity     Worry: Not on file     Inability: Not on file    Transportation needs     Medical: Not on file     Non-medical: Not on file   Tobacco Use    Smoking status: Former Smoker     Packs/day: 0.50     Types: Cigarettes     Last attempt to quit: 2/18/2017     Years since quitting: 3.7    Smokeless tobacco: Never Used   Substance and Sexual Activity    Alcohol use: No    Drug use: Yes     Types: Marijuana     Comment: occasional    Sexual activity: Yes     Partners: Female   Lifestyle    Physical activity     Days per week: Not on file     Minutes per session: Not on file    Stress: Not on file   Relationships    Social connections     Talks on phone: Not on file     Gets together: Not on file     Attends Worship service: Not on file     Active member of club or organization: Not on file     Attends meetings of clubs or organizations: Not on file     Relationship status: Not on file    Intimate partner violence     Fear of current or ex partner: Not on file     Emotionally abused: Not on file     Physically abused: Not on file     Forced sexual activity: Not on file   Other Topics Concern    Not on file   Social History Narrative    Not on file     Current Facility-Administered Medications   Medication Dose Route Frequency Provider Last Rate Last Dose    piperacillin-tazobactam (ZOSYN) 3.375 g in sodium chloride 0.9 % 100 mL IVPB (mini-bag)  3.375 g Intravenous Once Jewels Ambrocio MD        0.9 % sodium chloride bolus  1,000 mL Intravenous Once Jewels Ambrocio MD         Current Outpatient Medications   Medication Sig Dispense Refill    tiZANidine (ZANAFLEX) 4 MG tablet Take 4 mg by mouth every 6 hours as needed      VENTOLIN  (90 Base) MCG/ACT inhaler        No Known Allergies    Nursing Notes Reviewed    Physical Exam:  ED Triage Vitals [11/19/20 0244]   Enc Vitals Group      BP (!) 140/96      Pulse 76      Resp 20      Temp 97.6 °F (36.4 °C)      Temp Source Oral      SpO2 100 %      Weight 123 lb (55.8 kg)      Height 5' 7\" (1.702 m)      Head Circumference       Peak Flow       Pain Score       Pain Loc       Pain Edu? Excl. in [address]? GENERAL APPEARANCE: Awake and alert. Cooperative. Abdominal pain   HEAD: Normocephalic. Atraumatic. EYES: EOM's grossly intact. Sclera anicteric. ENT: Mucous membranes are moist. Tolerates saliva. No trismus. NECK: Supple. No meningismus. Trachea midline. HEART: RRR. Radial pulses 2+. Femoral pulses equal and symmetrical.  LUNGS: Respirations unlabored. CTAB, no rales or rhonchi  ABDOMEN: Soft. Thin, right upper quadrant tenderness that is reproducible and midepigastric tenderness that is reproducible. Normal bowel sounds  EXTREMITIES: No acute deformities. SKIN: Warm and dry. NEUROLOGICAL: No gross facial drooping. Moves all 4 extremities spontaneously. GCS 15. Cranial nerves normal.  PSYCHIATRIC: Normal mood.     I have reviewed and interpreted all of the currently available lab results from this visit (if applicable):  Results for orders placed or performed during the hospital encounter of 11/19/20   CBC Auto Differential   Result Value Ref Range    WBC 9.8 4.0 - 11.0 K/uL    RBC 4.54 4.20 - 5.90 M/uL    Hemoglobin 14.3 13.5 - 17.5 g/dL    Hematocrit 41.3 40.5 - 52.5 %    MCV 91.1 80.0 - 100.0 fL    MCH 31.5 26.0 - 34.0 pg    MCHC 34.6 31.0 - 36.0 g/dL    RDW 12.9 12.4 - 15.4 %    Platelets 010 823 - 665 K/uL    MPV 7.6 5.0 - 10.5 fL    Neutrophils % 71.0 %    Lymphocytes % 16.8 %    Monocytes % 11.0 %    Eosinophils % 0.4 %    Basophils % 0.8 %    Neutrophils Absolute 7.0 1.7 - 7.7 K/uL    Lymphocytes Absolute 1.7 1.0 - 5.1 K/uL    Monocytes Absolute 1.1 0.0 - 1.3 K/uL Eosinophils Absolute 0.0 0.0 - 0.6 K/uL    Basophils Absolute 0.1 0.0 - 0.2 K/uL   Comprehensive Metabolic Panel   Result Value Ref Range    Sodium 137 136 - 145 mmol/L    Potassium 4.5 3.5 - 5.1 mmol/L    Chloride 104 99 - 110 mmol/L    CO2 18 (L) 21 - 32 mmol/L    Anion Gap 15 3 - 16    Glucose 104 (H) 70 - 99 mg/dL    BUN 14 7 - 20 mg/dL    CREATININE 0.8 (L) 0.9 - 1.3 mg/dL    GFR Non-African American >60 >60    GFR African American >60 >60    Calcium 9.3 8.3 - 10.6 mg/dL    Total Protein 7.3 6.4 - 8.2 g/dL    Alb 4.7 3.4 - 5.0 g/dL    Albumin/Globulin Ratio 1.8 1.1 - 2.2    Total Bilirubin 1.2 (H) 0.0 - 1.0 mg/dL    Alkaline Phosphatase 70 40 - 129 U/L    ALT 19 10 - 40 U/L    AST 32 15 - 37 U/L    Globulin 2.6 g/dL   Lipase   Result Value Ref Range    Lipase 34.0 13.0 - 60.0 U/L   Urine, reflex to culture    Specimen: Urine, clean catch   Result Value Ref Range    Color, UA Yellow Straw/Yellow    Clarity, UA Clear Clear    Glucose, Ur Negative Negative mg/dL    Bilirubin Urine SMALL (A) Negative    Ketones, Urine >=80 (A) Negative mg/dL    Specific Gravity, UA 1.020 1.005 - 1.030    Blood, Urine Negative Negative    pH, UA 8.5 (A) 5.0 - 8.0    Protein, UA TRACE (A) Negative mg/dL    Urobilinogen, Urine 1.0 <2.0 E.U./dL    Nitrite, Urine Negative Negative    Leukocyte Esterase, Urine Negative Negative    Microscopic Examination YES     Urine Type NotGiven     Urine Reflex to Culture Not Indicated    Troponin   Result Value Ref Range    Troponin <0.01 <0.01 ng/mL   Microscopic Urinalysis   Result Value Ref Range    Mucus, UA 3+ (A) None Seen /LPF    WBC, UA 0-2 0 - 5 /HPF    RBC, UA 0-2 0 - 4 /HPF    Epithelial Cells, UA 0-1 0 - 5 /HPF      Radiographs (if obtained):  [] The following radiograph was interpreted by myself in the absence of a radiologist:   [] Radiologist's Report Reviewed:  US GALLBLADDER RUQ   Final Result   Cholelithiasis with mild gallbladder wall thickening. No pericholecystic   fluid. Negative sonographic Campos sign. XR CHEST PORTABLE   Final Result   No radiographic evidence of acute cardiopulmonary disease. EKG (if obtained): (All EKG's are interpreted by myself in the absence of a cardiologist) EKG normal.  Normal sinus rhythm. Rate 67. Normal axis. . QRS 88. QTc 456. Normal R wave progression. No acute ST elevation or depression. No Q waves. No A. fib or a flutter. No Brugada. Nonspecific ST changes in the anterior leads. Chart review shows recent radiographs:  No results found. MDM:  54-year-old male presents with history and physical most concerning for potential gallbladder disease. EKG normal.  No evidence of ST elevation MI. Fluids, pain medications, initial laboratory evaluations, antiemetics to initiate care. Right upper quadrant ultrasound ordered. 0400: Symptoms improved. White count normal.  No leukocytosis or anemia. CMP with normal kidney function. Normal electrolytes. ALT and AST normal.  Alk phos normal.  T bili 1.2. Lipase negative. Patient on weight ultrasound    0530: Patient refers required 12 mg IV morphine, 30 mg IV Toradol, 4 mg IV Zofran, 25 mg IV Phenergan to try and control his pain. Nausea and vomiting is better however pain continues. Ultrasound with stone and sludge and gallbladder wall thickening. Repeat exam demonstrates for me continued positive Campos's. Patient n.p.o., fluid bolus for second secondary to 80+ ketones in urine and IV Zosyn for antibiotic coverage. Consult to general surgery      Clinical Impression:  1. Symptomatic cholelithiasis    2. Thickening of wall of gallbladder    3. Nausea and vomiting in adult      Disposition referral (if applicable):  No follow-up provider specified.   Disposition medications (if applicable):  New Prescriptions    No medications on file       Comment: Please note this report has been produced using speech recognition software and may contain errors

## 2020-11-19 NOTE — PROGRESS NOTES
Pt received lovenox in ER, Pharmacy Yaakov will place hold on heparin, will update dr Yordy Francisco

## 2020-11-19 NOTE — H&P
Department of General Surgery - Adult   History and Physical      PATIENT NAME: Zaynab Adkins OF BIRTH: 1985    ADMISSION DATE: 11/19/2020  2:36 AM      TODAY'S DATE: 11/19/2020    CHIEF COMPLAINT: Abdominal pain      HISTORY OF PRESENT ILLNESS:  The patient is a 28 y.o. male  who presents with severe abdominal pain. He states he has a sharp stabbing pain in the right upper quadrant. It has been going on since about 9:00 last night. It came on suddenly. It comes in waves and radiates to his right back and right shoulder. He has associated nausea and vomiting. He denies fever or chills. He has never had pain like this before    Past Medical History:        Diagnosis Date    Kidney stone     Polyp of colon        Past Surgical History:        Procedure Laterality Date    COLONOSCOPY  06/05/2017    polyps    HAND SURGERY  2009    right    ID SHOULDER SCOPE BONE SHAVING Right 10/15/2018    RIGHT SHOULDER VIDEO ARTHROSCOPY, DISTAL CLAVICLE EXCISION performed by Ana Huang DO at [facility] Right 10/15/2018    TOE SURGERY Left 07/03/2017    matrixectomy fibular schaeffer    WISDOM TOOTH EXTRACTION         Medications Prior to Admission:   Prior to Admission medications    Medication Sig Start Date End Date Taking? Authorizing Provider   tiZANidine (ZANAFLEX) 4 MG tablet Take 4 mg by mouth every 6 hours as needed   Yes Historical Provider, MD CONKLIN  (90 Base) MCG/ACT inhaler  5/24/19   Historical Provider, MD       Allergies:  Patient has no known allergies. Social History:   TOBACCO:   reports that he quit smoking about 3 years ago. His smoking use included cigarettes. He smoked 0.50 packs per day. He has never used smokeless tobacco.  ETOH:   reports no history of alcohol use. DRUGS:   reports current drug use. Drug: Marijuana.       Family History:       Problem Relation Age of Onset    Arthritis Other     Asthma Other     Cancer Other  Diabetes Other     High Blood Pressure Other     Stroke Other        REVIEW OF SYSTEMS:    CONSTITUTIONAL:  negative  HEENT:  Negative  RESPIRATORY:  negative  CARDIOVASCULAR:  negative  GASTROINTESTINAL:  positive for nausea, vomiting and abdominal pain  GENITOURINARY:  negative  HEMATOLOGIC/LYMPHATIC:  negative  ENDOCRINE:  Negative  NEUROLOGICAL:  Negative  * All other ROS reviewed and negative. PHYSICAL EXAM:    VITALS:  BP (!) 142/111   Pulse 64   Temp 97.6 °F (36.4 °C) (Oral)   Resp 18   Ht 5' 7\" (1.702 m)   Wt 123 lb (55.8 kg)   SpO2 98%   BMI 19.26 kg/m²   INTAKE/OUTPUT:   No intake/output data recorded. No intake/output data recorded. CONSTITUTIONAL:  awake, alert, mild distress and thin  ENT:  normocephalic, without obvious abnormality  NECK:  supple, symmetrical, trachea midline   LUNGS:  clear to auscultation  CARDIOVASCULAR:  regular rate and rhythm and no murmur noted  ABDOMEN:  no scars, normal bowel sounds, soft, non-distended, tenderness noted in the right upper quadrant Campos's sign is present, voluntary guarding present, no masses palpated and hernia absent  MUSCULOSKELETAL:  0+ pitting edema lower extremities  NEUROLOGIC:  Mental Status Exam:  Level of Alertness:   awake  Orientation:   person, place, time  SKIN:  no bruising or bleeding, normal skin color, texture, turgor and no redness, warmth, or swelling      DATA:  CBC:   Recent Labs     11/19/20  0244   WBC 9.8   HGB 14.3   HCT 41.3        BMP:    Recent Labs     11/19/20  0244      K 4.5      CO2 18*   BUN 14   CREATININE 0.8*   GLUCOSE 104*     Hepatic:   Recent Labs     11/19/20  0244   AST 32   ALT 19   BILITOT 1.2*   ALKPHOS 70     Mag:    No results for input(s): MG in the last 72 hours. Phos:   No results for input(s): PHOS in the last 72 hours. INR: No results for input(s): INR in the last 72 hours. Radiology Review: Images personally reviewed by me.    Ultrasound images reviewed and show gallstones with mild gallbladder wall thickening      ASSESSMENT AND PLAN:  Acute cholecystitis with cholelithiasis. Plan for robotic cholecystectomy, possible open cholecystectomy. I explained the procedure including risks, benefits, and alternatives. Questions were answered and the patient agrees to proceed.               Electronically signed by Aguilar Levy MD     [facility]

## 2020-11-19 NOTE — ED NOTES
Report given to Elvia Bailey RN. Patient transported to OR by transport team, via hospital bed.      Danica Richardson RN  11/19/20 0213

## 2020-11-19 NOTE — OP NOTE
[address]                 [address] [address]                                OPERATIVE REPORT    PATIENT NAME: Mavis Conroy                    :        1985  MED REC NO:   [id number]                          ROOM:       14  ACCOUNT NO:   [de-identified]                           ADMIT DATE: 2020  PROVIDER:     Miesha Bernal. MD Nazanin    DATE OF PROCEDURE:  2020    PREOPERATIVE DIAGNOSES:  Cholecystitis and cholelithiasis. POSTOPERATIVE DIAGNOSIS:  Cholecystitis and cholelithiasis. OPERATION PERFORMED:  Robotic cholecystectomy. SURGEON:  David Back MD    ANESTHESIA:  General.    ESTIMATED BLOOD LOSS:  Less than 50 mL. INDICATION:  The patient is a 59-year-old gentleman, who presented with  abdominal pain, who was found to have cholecystitis and cholelithiasis. OPERATIVE SUMMARY:  After preoperative evaluation, the patient was  brought in the operating suite and placed in a comfortable supine  position on the operating room table. Monitoring equipment was attached  and general anesthesia was induced. His abdomen was sterilely prepped  and draped, and a small incision was made at the inferior aspect of the  umbilicus. This was dissected down to the fascia, and a suture of  0-Ethibond was placed on either side of the midline. The midline fascia  was opened, and the peritoneal cavity was entered directly. A Cristi  trocar was inserted, and the abdomen was insufflated to a pressure of 15  mmHg. The remaining ports were placed under direct internal  visualization. He was placed in reverse Trendelenburg and rotated to  the left and robot was docked. The gallbladder was grasped and elevated  cephalad over the liver edge. The infundibular structures were  dissected out, and the cystic duct and artery were identified. This  coursed together and anatomic identification was facilitated with the  use of Firefly imaging. His biliary structures were readily identified. The cystic duct and artery were triply clipped and divided. The  gallbladder was dissected free of liver bed using electrocautery. The  gallbladder fossa was examined. There was no evidence of bleeding. There was no bile leakage, either by direct imaging or Firefly imaging. The robot was therefore undocked and the trocars were withdrawn and the  gallbladder withdrawn through the umbilical trocar site. The umbilical  fascial defect was closed with a figure-of-eight suture of 0-Ethibond. Wounds were injected with Marcaine, and the skin incisions closed with  subcuticular sutures of 4-0 Vicryl followed by Benzoin, Steri-Strips,  and dry sterile dressings. All sponge, needle, and instrument counts  were correct at the end of the case. The patient tolerated the  procedure well and was taken to the recovery area in stable condition.         Adrián Ontiveros MD    D: 11/19/2020 11:58:46       T: 11/19/2020 12:07:46     [email or web address]  Job#: [id number]     Doc#: [id number]    CC:  Gala Raymond CNP

## 2020-11-20 ENCOUNTER — HOSPITAL ENCOUNTER (INPATIENT)
Age: 35
LOS: 2 days | Discharge: HOME | End: 2020-11-22
Attending: EMERGENCY MEDICINE | Admitting: SURGERY
Payer: MEDICARE

## 2020-11-20 ENCOUNTER — APPOINTMENT (OUTPATIENT)
Dept: GENERAL RADIOLOGY | Age: 35
End: 2020-11-20
Payer: MEDICARE

## 2020-11-20 ENCOUNTER — APPOINTMENT (OUTPATIENT)
Dept: CT IMAGING | Age: 35
End: 2020-11-20
Payer: MEDICARE

## 2020-11-20 ENCOUNTER — ANESTHESIA (OUTPATIENT)
Dept: OPERATING ROOM | Age: 35
End: 2020-11-20
Payer: MEDICARE

## 2020-11-20 ENCOUNTER — ANESTHESIA EVENT (OUTPATIENT)
Dept: OPERATING ROOM | Age: 35
End: 2020-11-20
Payer: MEDICARE

## 2020-11-20 VITALS — OXYGEN SATURATION: 100 % | SYSTOLIC BLOOD PRESSURE: 118 MMHG | DIASTOLIC BLOOD PRESSURE: 61 MMHG

## 2020-11-20 PROBLEM — G89.18 ACUTE POSTOPERATIVE PAIN: Status: ACTIVE | Noted: 2020-11-20

## 2020-11-20 LAB
A/G RATIO: 1.9 (ref 1.1–2.2)
ABO/RH: NORMAL
ALBUMIN SERPL-MCNC: 3.9 G/DL (ref 3.4–5)
ALP BLD-CCNC: 56 U/L (ref 40–129)
ALT SERPL-CCNC: 19 U/L (ref 10–40)
ANION GAP SERPL CALCULATED.3IONS-SCNC: 10 MMOL/L (ref 3–16)
ANTIBODY SCREEN: NORMAL
AST SERPL-CCNC: 19 U/L (ref 15–37)
BASOPHILS ABSOLUTE: 0 K/UL (ref 0–0.2)
BASOPHILS RELATIVE PERCENT: 0 %
BILIRUB SERPL-MCNC: 0.7 MG/DL (ref 0–1)
BILIRUBIN URINE: ABNORMAL
BLOOD, URINE: NEGATIVE
BUN BLDV-MCNC: 19 MG/DL (ref 7–20)
CALCIUM SERPL-MCNC: 8.4 MG/DL (ref 8.3–10.6)
CHLORIDE BLD-SCNC: 104 MMOL/L (ref 99–110)
CLARITY: CLEAR
CO2: 21 MMOL/L (ref 21–32)
COLOR: YELLOW
CREAT SERPL-MCNC: 1 MG/DL (ref 0.9–1.3)
EOSINOPHILS ABSOLUTE: 0 K/UL (ref 0–0.6)
EOSINOPHILS RELATIVE PERCENT: 0 %
GFR AFRICAN AMERICAN: >60
GFR NON-AFRICAN AMERICAN: >60
GLOBULIN: 2.1 G/DL
GLUCOSE BLD-MCNC: 139 MG/DL (ref 70–99)
GLUCOSE URINE: 250 MG/DL
HCT VFR BLD CALC: 22.9 % (ref 40.5–52.5)
HCT VFR BLD CALC: 23.5 % (ref 40.5–52.5)
HCT VFR BLD CALC: 28.5 % (ref 40.5–52.5)
HEMOGLOBIN: 7.8 G/DL (ref 13.5–17.5)
HEMOGLOBIN: 8.1 G/DL (ref 13.5–17.5)
HEMOGLOBIN: 9.8 G/DL (ref 13.5–17.5)
KETONES, URINE: 15 MG/DL
LACTIC ACID: 1.4 MMOL/L (ref 0.4–2)
LEUKOCYTE ESTERASE, URINE: NEGATIVE
LIPASE: 134 U/L (ref 13–60)
LYMPHOCYTES ABSOLUTE: 1.9 K/UL (ref 1–5.1)
LYMPHOCYTES RELATIVE PERCENT: 12 %
MCH RBC QN AUTO: 31.3 PG (ref 26–34)
MCHC RBC AUTO-ENTMCNC: 34.5 G/DL (ref 31–36)
MCV RBC AUTO: 91 FL (ref 80–100)
MICROSCOPIC EXAMINATION: YES
MONOCYTES ABSOLUTE: 0.6 K/UL (ref 0–1.3)
MONOCYTES RELATIVE PERCENT: 4 %
MUCUS: ABNORMAL /LPF
NEUTROPHILS ABSOLUTE: 13.5 K/UL (ref 1.7–7.7)
NEUTROPHILS RELATIVE PERCENT: 84 %
NITRITE, URINE: NEGATIVE
PDW BLD-RTO: 12.8 % (ref 12.4–15.4)
PH UA: 6 (ref 5–8)
PLATELET # BLD: 248 K/UL (ref 135–450)
PLATELET SLIDE REVIEW: ADEQUATE
PMV BLD AUTO: 7.9 FL (ref 5–10.5)
POTASSIUM SERPL-SCNC: 3.5 MMOL/L (ref 3.5–5.1)
PROTEIN UA: ABNORMAL MG/DL
RBC # BLD: 3.13 M/UL (ref 4.2–5.9)
RBC UA: ABNORMAL /HPF (ref 0–4)
SARS-COV-2, NAAT: NOT DETECTED
SLIDE REVIEW: ABNORMAL
SODIUM BLD-SCNC: 135 MMOL/L (ref 136–145)
SPECIFIC GRAVITY UA: >=1.03 (ref 1–1.03)
TOTAL PROTEIN: 6 G/DL (ref 6.4–8.2)
TROPONIN: <0.01 NG/ML
URINE REFLEX TO CULTURE: ABNORMAL
URINE TYPE: ABNORMAL
UROBILINOGEN, URINE: 0.2 E.U./DL
WBC # BLD: 16.1 K/UL (ref 4–11)
WBC UA: ABNORMAL /HPF (ref 0–5)

## 2020-11-20 PROCEDURE — 49320 DIAG LAPARO SEPARATE PROC: CPT | Performed by: SURGERY

## 2020-11-20 PROCEDURE — 2500000003 HC RX 250 WO HCPCS: Performed by: NURSE ANESTHETIST, CERTIFIED REGISTERED

## 2020-11-20 PROCEDURE — 6360000002 HC RX W HCPCS: Performed by: SURGERY

## 2020-11-20 PROCEDURE — G0378 HOSPITAL OBSERVATION PER HR: HCPCS

## 2020-11-20 PROCEDURE — 84484 ASSAY OF TROPONIN QUANT: CPT

## 2020-11-20 PROCEDURE — C9113 INJ PANTOPRAZOLE SODIUM, VIA: HCPCS | Performed by: NURSE PRACTITIONER

## 2020-11-20 PROCEDURE — 2709999900 HC NON-CHARGEABLE SUPPLY: Performed by: SURGERY

## 2020-11-20 PROCEDURE — 2500000003 HC RX 250 WO HCPCS: Performed by: SURGERY

## 2020-11-20 PROCEDURE — U0002 COVID-19 LAB TEST NON-CDC: HCPCS

## 2020-11-20 PROCEDURE — 96375 TX/PRO/DX INJ NEW DRUG ADDON: CPT

## 2020-11-20 PROCEDURE — 2720000010 HC SURG SUPPLY STERILE: Performed by: SURGERY

## 2020-11-20 PROCEDURE — 6360000002 HC RX W HCPCS: Performed by: NURSE ANESTHETIST, CERTIFIED REGISTERED

## 2020-11-20 PROCEDURE — 2580000003 HC RX 258: Performed by: NURSE PRACTITIONER

## 2020-11-20 PROCEDURE — 6360000004 HC RX CONTRAST MEDICATION: Performed by: EMERGENCY MEDICINE

## 2020-11-20 PROCEDURE — 94150 VITAL CAPACITY TEST: CPT

## 2020-11-20 PROCEDURE — 0W3R4ZZ CONTROL BLEEDING IN GENITOURINARY TRACT, PERCUTANEOUS ENDOSCOPIC APPROACH: ICD-10-PCS | Performed by: SURGERY

## 2020-11-20 PROCEDURE — 6360000002 HC RX W HCPCS: Performed by: ANESTHESIOLOGY

## 2020-11-20 PROCEDURE — 99283 EMERGENCY DEPT VISIT LOW MDM: CPT

## 2020-11-20 PROCEDURE — 2580000003 HC RX 258: Performed by: SURGERY

## 2020-11-20 PROCEDURE — 3700000000 HC ANESTHESIA ATTENDED CARE: Performed by: SURGERY

## 2020-11-20 PROCEDURE — 6370000000 HC RX 637 (ALT 250 FOR IP): Performed by: SURGERY

## 2020-11-20 PROCEDURE — 96374 THER/PROPH/DIAG INJ IV PUSH: CPT

## 2020-11-20 PROCEDURE — 83690 ASSAY OF LIPASE: CPT

## 2020-11-20 PROCEDURE — 3700000001 HC ADD 15 MINUTES (ANESTHESIA): Performed by: SURGERY

## 2020-11-20 PROCEDURE — 74174 CTA ABD&PLVS W/CONTRAST: CPT

## 2020-11-20 PROCEDURE — 1200000000 HC SEMI PRIVATE

## 2020-11-20 PROCEDURE — 3600000014 HC SURGERY LEVEL 4 ADDTL 15MIN: Performed by: SURGERY

## 2020-11-20 PROCEDURE — 6360000002 HC RX W HCPCS: Performed by: NURSE PRACTITIONER

## 2020-11-20 PROCEDURE — 80053 COMPREHEN METABOLIC PANEL: CPT

## 2020-11-20 PROCEDURE — 74177 CT ABD & PELVIS W/CONTRAST: CPT

## 2020-11-20 PROCEDURE — 99024 POSTOP FOLLOW-UP VISIT: CPT | Performed by: NURSE PRACTITIONER

## 2020-11-20 PROCEDURE — 85014 HEMATOCRIT: CPT

## 2020-11-20 PROCEDURE — 2500000003 HC RX 250 WO HCPCS

## 2020-11-20 PROCEDURE — 86900 BLOOD TYPING SEROLOGIC ABO: CPT

## 2020-11-20 PROCEDURE — 7100000001 HC PACU RECOVERY - ADDTL 15 MIN: Performed by: SURGERY

## 2020-11-20 PROCEDURE — 86850 RBC ANTIBODY SCREEN: CPT

## 2020-11-20 PROCEDURE — 85025 COMPLETE CBC W/AUTO DIFF WBC: CPT

## 2020-11-20 PROCEDURE — 71045 X-RAY EXAM CHEST 1 VIEW: CPT

## 2020-11-20 PROCEDURE — 6360000002 HC RX W HCPCS: Performed by: EMERGENCY MEDICINE

## 2020-11-20 PROCEDURE — 6360000002 HC RX W HCPCS

## 2020-11-20 PROCEDURE — 36415 COLL VENOUS BLD VENIPUNCTURE: CPT

## 2020-11-20 PROCEDURE — 2580000003 HC RX 258: Performed by: NURSE ANESTHETIST, CERTIFIED REGISTERED

## 2020-11-20 PROCEDURE — 86901 BLOOD TYPING SEROLOGIC RH(D): CPT

## 2020-11-20 PROCEDURE — 7100000000 HC PACU RECOVERY - FIRST 15 MIN: Performed by: SURGERY

## 2020-11-20 PROCEDURE — 83605 ASSAY OF LACTIC ACID: CPT

## 2020-11-20 PROCEDURE — 3600000004 HC SURGERY LEVEL 4 BASE: Performed by: SURGERY

## 2020-11-20 PROCEDURE — 81001 URINALYSIS AUTO W/SCOPE: CPT

## 2020-11-20 PROCEDURE — 6360000004 HC RX CONTRAST MEDICATION: Performed by: SURGERY

## 2020-11-20 PROCEDURE — 2580000003 HC RX 258: Performed by: EMERGENCY MEDICINE

## 2020-11-20 PROCEDURE — 85018 HEMOGLOBIN: CPT

## 2020-11-20 RX ORDER — 0.9 % SODIUM CHLORIDE 0.9 %
1000 INTRAVENOUS SOLUTION INTRAVENOUS ONCE
Status: COMPLETED | OUTPATIENT
Start: 2020-11-20 | End: 2020-11-20

## 2020-11-20 RX ORDER — SUCCINYLCHOLINE CHLORIDE 20 MG/ML
INJECTION INTRAMUSCULAR; INTRAVENOUS PRN
Status: DISCONTINUED | OUTPATIENT
Start: 2020-11-20 | End: 2020-11-20 | Stop reason: SDUPTHER

## 2020-11-20 RX ORDER — ONDANSETRON 2 MG/ML
4 INJECTION INTRAMUSCULAR; INTRAVENOUS EVERY 6 HOURS PRN
Status: DISCONTINUED | OUTPATIENT
Start: 2020-11-20 | End: 2020-11-22 | Stop reason: HOSPADM

## 2020-11-20 RX ORDER — ONDANSETRON 2 MG/ML
INJECTION INTRAMUSCULAR; INTRAVENOUS PRN
Status: DISCONTINUED | OUTPATIENT
Start: 2020-11-20 | End: 2020-11-20 | Stop reason: SDUPTHER

## 2020-11-20 RX ORDER — OXYCODONE HYDROCHLORIDE AND ACETAMINOPHEN 5; 325 MG/1; MG/1
1 TABLET ORAL PRN
Status: DISCONTINUED | OUTPATIENT
Start: 2020-11-20 | End: 2020-11-20 | Stop reason: HOSPADM

## 2020-11-20 RX ORDER — PROPOFOL 10 MG/ML
INJECTION, EMULSION INTRAVENOUS PRN
Status: DISCONTINUED | OUTPATIENT
Start: 2020-11-20 | End: 2020-11-20 | Stop reason: SDUPTHER

## 2020-11-20 RX ORDER — ROCURONIUM BROMIDE 10 MG/ML
INJECTION, SOLUTION INTRAVENOUS PRN
Status: DISCONTINUED | OUTPATIENT
Start: 2020-11-20 | End: 2020-11-20 | Stop reason: SDUPTHER

## 2020-11-20 RX ORDER — SODIUM CHLORIDE 0.9 % (FLUSH) 0.9 %
10 SYRINGE (ML) INJECTION EVERY 12 HOURS SCHEDULED
Status: DISCONTINUED | OUTPATIENT
Start: 2020-11-20 | End: 2020-11-22 | Stop reason: HOSPADM

## 2020-11-20 RX ORDER — ONDANSETRON 4 MG/1
4 TABLET, ORALLY DISINTEGRATING ORAL EVERY 8 HOURS PRN
Status: DISCONTINUED | OUTPATIENT
Start: 2020-11-20 | End: 2020-11-22 | Stop reason: HOSPADM

## 2020-11-20 RX ORDER — SODIUM CHLORIDE 0.9 % (FLUSH) 0.9 %
10 SYRINGE (ML) INJECTION DAILY
Status: DISCONTINUED | OUTPATIENT
Start: 2020-11-20 | End: 2020-11-22 | Stop reason: HOSPADM

## 2020-11-20 RX ORDER — MORPHINE SULFATE 10 MG/ML
2 INJECTION, SOLUTION INTRAMUSCULAR; INTRAVENOUS EVERY 5 MIN PRN
Status: DISCONTINUED | OUTPATIENT
Start: 2020-11-20 | End: 2020-11-20 | Stop reason: HOSPADM

## 2020-11-20 RX ORDER — ACETAMINOPHEN 325 MG/1
650 TABLET ORAL EVERY 6 HOURS PRN
Status: DISCONTINUED | OUTPATIENT
Start: 2020-11-20 | End: 2020-11-22 | Stop reason: HOSPADM

## 2020-11-20 RX ORDER — FENTANYL CITRATE 50 UG/ML
INJECTION, SOLUTION INTRAMUSCULAR; INTRAVENOUS PRN
Status: DISCONTINUED | OUTPATIENT
Start: 2020-11-20 | End: 2020-11-20 | Stop reason: SDUPTHER

## 2020-11-20 RX ORDER — MAGNESIUM HYDROXIDE 1200 MG/15ML
LIQUID ORAL CONTINUOUS PRN
Status: COMPLETED | OUTPATIENT
Start: 2020-11-20 | End: 2020-11-20

## 2020-11-20 RX ORDER — PROMETHAZINE HYDROCHLORIDE 25 MG/ML
6.25 INJECTION, SOLUTION INTRAMUSCULAR; INTRAVENOUS
Status: DISCONTINUED | OUTPATIENT
Start: 2020-11-20 | End: 2020-11-20 | Stop reason: HOSPADM

## 2020-11-20 RX ORDER — SODIUM CHLORIDE, SODIUM LACTATE, POTASSIUM CHLORIDE, CALCIUM CHLORIDE 600; 310; 30; 20 MG/100ML; MG/100ML; MG/100ML; MG/100ML
INJECTION, SOLUTION INTRAVENOUS CONTINUOUS PRN
Status: DISCONTINUED | OUTPATIENT
Start: 2020-11-20 | End: 2020-11-20 | Stop reason: SDUPTHER

## 2020-11-20 RX ORDER — MEPERIDINE HYDROCHLORIDE 25 MG/ML
12.5 INJECTION INTRAMUSCULAR; INTRAVENOUS; SUBCUTANEOUS EVERY 5 MIN PRN
Status: DISCONTINUED | OUTPATIENT
Start: 2020-11-20 | End: 2020-11-20 | Stop reason: HOSPADM

## 2020-11-20 RX ORDER — SODIUM CHLORIDE AND POTASSIUM CHLORIDE .9; .15 G/100ML; G/100ML
SOLUTION INTRAVENOUS CONTINUOUS
Status: DISCONTINUED | OUTPATIENT
Start: 2020-11-20 | End: 2020-11-22 | Stop reason: HOSPADM

## 2020-11-20 RX ORDER — ONDANSETRON 2 MG/ML
4 INJECTION INTRAMUSCULAR; INTRAVENOUS
Status: DISCONTINUED | OUTPATIENT
Start: 2020-11-20 | End: 2020-11-20 | Stop reason: HOSPADM

## 2020-11-20 RX ORDER — DIPHENHYDRAMINE HYDROCHLORIDE 50 MG/ML
12.5 INJECTION INTRAMUSCULAR; INTRAVENOUS
Status: DISCONTINUED | OUTPATIENT
Start: 2020-11-20 | End: 2020-11-20 | Stop reason: HOSPADM

## 2020-11-20 RX ORDER — HYDRALAZINE HYDROCHLORIDE 20 MG/ML
5 INJECTION INTRAMUSCULAR; INTRAVENOUS EVERY 10 MIN PRN
Status: DISCONTINUED | OUTPATIENT
Start: 2020-11-20 | End: 2020-11-20 | Stop reason: HOSPADM

## 2020-11-20 RX ORDER — PANTOPRAZOLE SODIUM 40 MG/10ML
40 INJECTION, POWDER, LYOPHILIZED, FOR SOLUTION INTRAVENOUS DAILY
Status: DISCONTINUED | OUTPATIENT
Start: 2020-11-20 | End: 2020-11-22 | Stop reason: HOSPADM

## 2020-11-20 RX ORDER — OXYCODONE HYDROCHLORIDE 5 MG/1
10 TABLET ORAL EVERY 6 HOURS PRN
Status: DISCONTINUED | OUTPATIENT
Start: 2020-11-20 | End: 2020-11-22 | Stop reason: HOSPADM

## 2020-11-20 RX ORDER — SODIUM CHLORIDE, SODIUM LACTATE, POTASSIUM CHLORIDE, CALCIUM CHLORIDE 600; 310; 30; 20 MG/100ML; MG/100ML; MG/100ML; MG/100ML
INJECTION, SOLUTION INTRAVENOUS
Status: COMPLETED
Start: 2020-11-20 | End: 2020-11-20

## 2020-11-20 RX ORDER — MORPHINE SULFATE 10 MG/ML
1 INJECTION, SOLUTION INTRAMUSCULAR; INTRAVENOUS EVERY 5 MIN PRN
Status: DISCONTINUED | OUTPATIENT
Start: 2020-11-20 | End: 2020-11-20 | Stop reason: HOSPADM

## 2020-11-20 RX ORDER — LABETALOL HYDROCHLORIDE 5 MG/ML
5 INJECTION, SOLUTION INTRAVENOUS EVERY 10 MIN PRN
Status: DISCONTINUED | OUTPATIENT
Start: 2020-11-20 | End: 2020-11-20 | Stop reason: HOSPADM

## 2020-11-20 RX ORDER — MORPHINE SULFATE 4 MG/ML
4 INJECTION, SOLUTION INTRAMUSCULAR; INTRAVENOUS
Status: DISCONTINUED | OUTPATIENT
Start: 2020-11-20 | End: 2020-11-20

## 2020-11-20 RX ORDER — OXYCODONE HYDROCHLORIDE 5 MG/1
5 TABLET ORAL EVERY 6 HOURS PRN
Status: DISCONTINUED | OUTPATIENT
Start: 2020-11-20 | End: 2020-11-22 | Stop reason: HOSPADM

## 2020-11-20 RX ORDER — SODIUM CHLORIDE 9 MG/ML
INJECTION, SOLUTION INTRAVENOUS CONTINUOUS PRN
Status: DISCONTINUED | OUTPATIENT
Start: 2020-11-20 | End: 2020-11-20 | Stop reason: SDUPTHER

## 2020-11-20 RX ORDER — DEXAMETHASONE SODIUM PHOSPHATE 4 MG/ML
INJECTION, SOLUTION INTRA-ARTICULAR; INTRALESIONAL; INTRAMUSCULAR; INTRAVENOUS; SOFT TISSUE PRN
Status: DISCONTINUED | OUTPATIENT
Start: 2020-11-20 | End: 2020-11-20 | Stop reason: SDUPTHER

## 2020-11-20 RX ORDER — SODIUM CHLORIDE AND POTASSIUM CHLORIDE .9; .15 G/100ML; G/100ML
SOLUTION INTRAVENOUS
Status: COMPLETED
Start: 2020-11-20 | End: 2020-11-20

## 2020-11-20 RX ORDER — LIDOCAINE HYDROCHLORIDE 20 MG/ML
INJECTION, SOLUTION INFILTRATION; PERINEURAL PRN
Status: DISCONTINUED | OUTPATIENT
Start: 2020-11-20 | End: 2020-11-20 | Stop reason: SDUPTHER

## 2020-11-20 RX ORDER — SODIUM CHLORIDE 0.9 % (FLUSH) 0.9 %
10 SYRINGE (ML) INJECTION PRN
Status: DISCONTINUED | OUTPATIENT
Start: 2020-11-20 | End: 2020-11-22 | Stop reason: HOSPADM

## 2020-11-20 RX ORDER — OXYCODONE HYDROCHLORIDE AND ACETAMINOPHEN 5; 325 MG/1; MG/1
2 TABLET ORAL PRN
Status: DISCONTINUED | OUTPATIENT
Start: 2020-11-20 | End: 2020-11-20 | Stop reason: HOSPADM

## 2020-11-20 RX ORDER — BUPIVACAINE HYDROCHLORIDE 5 MG/ML
INJECTION, SOLUTION PERINEURAL PRN
Status: DISCONTINUED | OUTPATIENT
Start: 2020-11-20 | End: 2020-11-20 | Stop reason: HOSPADM

## 2020-11-20 RX ORDER — SODIUM CHLORIDE, SODIUM LACTATE, POTASSIUM CHLORIDE, AND CALCIUM CHLORIDE .6; .31; .03; .02 G/100ML; G/100ML; G/100ML; G/100ML
IRRIGANT IRRIGATION PRN
Status: DISCONTINUED | OUTPATIENT
Start: 2020-11-20 | End: 2020-11-20 | Stop reason: HOSPADM

## 2020-11-20 RX ADMIN — SODIUM CHLORIDE: 9 INJECTION, SOLUTION INTRAVENOUS at 12:12

## 2020-11-20 RX ADMIN — ROCURONIUM BROMIDE 10 MG: 10 INJECTION, SOLUTION INTRAVENOUS at 12:13

## 2020-11-20 RX ADMIN — ROCURONIUM BROMIDE 30 MG: 10 INJECTION, SOLUTION INTRAVENOUS at 12:04

## 2020-11-20 RX ADMIN — PIPERACILLIN SODIUM AND TAZOBACTAM SODIUM 3.38 G: 3; .375 INJECTION, POWDER, LYOPHILIZED, FOR SOLUTION INTRAVENOUS at 10:12

## 2020-11-20 RX ADMIN — LIDOCAINE HYDROCHLORIDE 50 MG: 20 INJECTION, SOLUTION INFILTRATION; PERINEURAL at 12:04

## 2020-11-20 RX ADMIN — PROPOFOL 200 MG: 10 INJECTION, EMULSION INTRAVENOUS at 12:04

## 2020-11-20 RX ADMIN — ONDANSETRON HYDROCHLORIDE 4 MG: 2 INJECTION, SOLUTION INTRAMUSCULAR; INTRAVENOUS at 08:35

## 2020-11-20 RX ADMIN — SUGAMMADEX 112 MG: 100 INJECTION, SOLUTION INTRAVENOUS at 12:49

## 2020-11-20 RX ADMIN — IOPAMIDOL 85 ML: 755 INJECTION, SOLUTION INTRAVENOUS at 10:39

## 2020-11-20 RX ADMIN — FENTANYL CITRATE 100 MCG: 50 INJECTION, SOLUTION INTRAMUSCULAR; INTRAVENOUS at 12:07

## 2020-11-20 RX ADMIN — POTASSIUM CHLORIDE AND SODIUM CHLORIDE 1000 ML: 900; 150 INJECTION, SOLUTION INTRAVENOUS at 11:03

## 2020-11-20 RX ADMIN — DEXAMETHASONE SODIUM PHOSPHATE 8 MG: 4 INJECTION, SOLUTION INTRAMUSCULAR; INTRAVENOUS at 12:10

## 2020-11-20 RX ADMIN — IOPAMIDOL 75 ML: 755 INJECTION, SOLUTION INTRAVENOUS at 08:13

## 2020-11-20 RX ADMIN — Medication 10 ML: at 15:42

## 2020-11-20 RX ADMIN — FENTANYL CITRATE 150 MCG: 50 INJECTION, SOLUTION INTRAMUSCULAR; INTRAVENOUS at 13:11

## 2020-11-20 RX ADMIN — PANTOPRAZOLE SODIUM 40 MG: 40 INJECTION, POWDER, FOR SOLUTION INTRAVENOUS at 15:42

## 2020-11-20 RX ADMIN — FENTANYL CITRATE 100 MCG: 50 INJECTION, SOLUTION INTRAMUSCULAR; INTRAVENOUS at 12:10

## 2020-11-20 RX ADMIN — POTASSIUM CHLORIDE AND SODIUM CHLORIDE: 900; 150 INJECTION, SOLUTION INTRAVENOUS at 14:43

## 2020-11-20 RX ADMIN — HYDROMORPHONE HYDROCHLORIDE 1 MG: 1 INJECTION, SOLUTION INTRAMUSCULAR; INTRAVENOUS; SUBCUTANEOUS at 17:10

## 2020-11-20 RX ADMIN — HYDROMORPHONE HYDROCHLORIDE 1 MG: 1 INJECTION, SOLUTION INTRAMUSCULAR; INTRAVENOUS; SUBCUTANEOUS at 22:12

## 2020-11-20 RX ADMIN — SODIUM CHLORIDE, POTASSIUM CHLORIDE, SODIUM LACTATE AND CALCIUM CHLORIDE: 600; 310; 30; 20 INJECTION, SOLUTION INTRAVENOUS at 12:00

## 2020-11-20 RX ADMIN — LIDOCAINE HYDROCHLORIDE 100 MG: 20 INJECTION, SOLUTION INFILTRATION; PERINEURAL at 12:49

## 2020-11-20 RX ADMIN — HYDROMORPHONE HYDROCHLORIDE 0.5 MG: 1 INJECTION, SOLUTION INTRAMUSCULAR; INTRAVENOUS; SUBCUTANEOUS at 14:01

## 2020-11-20 RX ADMIN — FENTANYL CITRATE 50 MCG: 50 INJECTION, SOLUTION INTRAMUSCULAR; INTRAVENOUS at 12:04

## 2020-11-20 RX ADMIN — SUCCINYLCHOLINE CHLORIDE 160 MG: 20 INJECTION, SOLUTION INTRAMUSCULAR; INTRAVENOUS at 12:04

## 2020-11-20 RX ADMIN — Medication 10 ML: at 21:06

## 2020-11-20 RX ADMIN — MORPHINE SULFATE 4 MG: 4 INJECTION INTRAVENOUS at 15:41

## 2020-11-20 RX ADMIN — ACETAMINOPHEN 650 MG: 325 TABLET ORAL at 19:00

## 2020-11-20 RX ADMIN — FENTANYL CITRATE 100 MCG: 50 INJECTION, SOLUTION INTRAMUSCULAR; INTRAVENOUS at 12:15

## 2020-11-20 RX ADMIN — MORPHINE SULFATE 4 MG: 4 INJECTION INTRAVENOUS at 08:35

## 2020-11-20 RX ADMIN — ONDANSETRON 4 MG: 2 INJECTION, SOLUTION INTRAMUSCULAR; INTRAVENOUS at 12:10

## 2020-11-20 RX ADMIN — SODIUM CHLORIDE 1000 ML: 9 INJECTION, SOLUTION INTRAVENOUS at 08:37

## 2020-11-20 RX ADMIN — OXYCODONE HYDROCHLORIDE 10 MG: 5 TABLET ORAL at 18:34

## 2020-11-20 RX ADMIN — HYDROMORPHONE HYDROCHLORIDE 1 MG: 1 INJECTION, SOLUTION INTRAMUSCULAR; INTRAVENOUS; SUBCUTANEOUS at 19:28

## 2020-11-20 RX ADMIN — PANTOPRAZOLE SODIUM 40 MG: 40 INJECTION, POWDER, FOR SOLUTION INTRAVENOUS at 10:12

## 2020-11-20 ASSESSMENT — PULMONARY FUNCTION TESTS
PIF_VALUE: 20
PIF_VALUE: 20
PIF_VALUE: 19
PIF_VALUE: 17
PIF_VALUE: 21
PIF_VALUE: 12
PIF_VALUE: 21
PIF_VALUE: 3
PIF_VALUE: 20
PIF_VALUE: 12
PIF_VALUE: 12
PIF_VALUE: 21
PIF_VALUE: 13
PIF_VALUE: 3
PIF_VALUE: 15
PIF_VALUE: 15
PIF_VALUE: 13
PIF_VALUE: 13
PIF_VALUE: 1
PIF_VALUE: 16
PIF_VALUE: 16
PIF_VALUE: 3
PIF_VALUE: 16
PIF_VALUE: 20
PIF_VALUE: 15
PIF_VALUE: 16
PIF_VALUE: 3
PIF_VALUE: 19
PIF_VALUE: 5
PIF_VALUE: 1
PIF_VALUE: 21
PIF_VALUE: 4
PIF_VALUE: 21
PIF_VALUE: 1
PIF_VALUE: 15
PIF_VALUE: 16
PIF_VALUE: 16
PIF_VALUE: 3
PIF_VALUE: 16
PIF_VALUE: 19
PIF_VALUE: 20
PIF_VALUE: 5
PIF_VALUE: 16
PIF_VALUE: 15
PIF_VALUE: 20
PIF_VALUE: 12
PIF_VALUE: 0
PIF_VALUE: 15
PIF_VALUE: 1
PIF_VALUE: 0
PIF_VALUE: 3
PIF_VALUE: 4
PIF_VALUE: 20
PIF_VALUE: 15
PIF_VALUE: 5
PIF_VALUE: 15
PIF_VALUE: 13
PIF_VALUE: 15
PIF_VALUE: 16
PIF_VALUE: 16
PIF_VALUE: 14
PIF_VALUE: 16
PIF_VALUE: 21

## 2020-11-20 ASSESSMENT — PAIN DESCRIPTION - PAIN TYPE
TYPE: SURGICAL PAIN
TYPE: SURGICAL PAIN
TYPE: ACUTE PAIN
TYPE: SURGICAL PAIN
TYPE: ACUTE PAIN

## 2020-11-20 ASSESSMENT — PAIN DESCRIPTION - ORIENTATION
ORIENTATION: MID
ORIENTATION: RIGHT;MID;UPPER
ORIENTATION: RIGHT;MID;UPPER
ORIENTATION: MID

## 2020-11-20 ASSESSMENT — PAIN SCALES - GENERAL
PAINLEVEL_OUTOF10: 10
PAINLEVEL_OUTOF10: 6
PAINLEVEL_OUTOF10: 10
PAINLEVEL_OUTOF10: 7
PAINLEVEL_OUTOF10: 10
PAINLEVEL_OUTOF10: 5
PAINLEVEL_OUTOF10: 10
PAINLEVEL_OUTOF10: 7
PAINLEVEL_OUTOF10: 7
PAINLEVEL_OUTOF10: 10
PAINLEVEL_OUTOF10: 10
PAINLEVEL_OUTOF10: 8
PAINLEVEL_OUTOF10: 5

## 2020-11-20 ASSESSMENT — PAIN DESCRIPTION - LOCATION
LOCATION: ABDOMEN

## 2020-11-20 ASSESSMENT — PAIN DESCRIPTION - DESCRIPTORS
DESCRIPTORS: PRESSURE

## 2020-11-20 NOTE — PROGRESS NOTES
Patient is able to demonstrate the ability to move from a reclining position to an upright position within the recliner.

## 2020-11-20 NOTE — ANESTHESIA POSTPROCEDURE EVALUATION
Department of Anesthesiology  Postprocedure Note    Patient: Padmini Brock  MRN: [id number]  YOB: 1985  Date of evaluation: 11/20/2020  Time:  3:27 PM     Procedure Summary     Date:  11/20/20 Room / Location:  [address] [place] [address] / [facility]    Anesthesia Start:  1200 Anesthesia Stop:  5577    Procedure:  EXPLORATORY LAPAROSCOPY (N/A Abdomen) Diagnosis:  (ABDOMINAL PAIN)    Surgeon:  Amador Johns MD Responsible Provider:  Tasha Mcwilliams MD    Anesthesia Type:  general ASA Status:  2 - Emergent          Anesthesia Type: general    Dennis Phase I: Dennis Score: 10    Dennis Phase II:      Last vitals: Reviewed and per EMR flowsheets. Anesthesia Post Evaluation    Patient location during evaluation: PACU  Patient participation: complete - patient participated  Level of consciousness: awake and alert  Airway patency: patent  Nausea & Vomiting: no nausea and no vomiting  Complications: no  Cardiovascular status: blood pressure returned to baseline  Respiratory status: acceptable  Hydration status: euvolemic  Comments: VSS on transfer to phase 2 recovery. No anesthetic complications.

## 2020-11-20 NOTE — H&P
findings. We will admit the patient for intravenous fluids, intravenous antibiotics, serial H&H, and serial abdominal examinations. Past Medical History   has a past medical history of Kidney stone and Polyp of colon. Hx of snorting pain pills, has been clean \"for years\". Past Surgical History   has a past surgical history that includes Hand surgery (2009); Colonoscopy (06/05/2017); Toe Surgery (Left, 07/03/2017); Stella tooth extraction; shoulder surgery (Right, 10/15/2018); pr shoulder scope bone shaving (Right, 10/15/2018); other surgical history; and Cholecystectomy, laparoscopic (N/A, 11/19/2020). Medications  Prior to Admission medications    Medication Sig Start Date End Date Taking? Authorizing Provider   tiZANidine (ZANAFLEX) 4 MG tablet Take 4 mg by mouth every 6 hours as needed    Historical Provider, MD   oxyCODONE-acetaminophen (ENDOCET) 5-325 MG per tablet Take 1 tablet by mouth every 6 hours as needed for Pain for up to 5 days. Intended supply: 5 days. Take lowest dose possible to manage pain 11/19/20 11/24/20  Vanad Walsh MD   VENTOLIN  (13 Base) MCG/ACT inhaler  5/24/19   Historical Provider, MD    Scheduled Meds:   piperacillin-tazobactam  3.375 g Intravenous Q8H    pantoprazole  40 mg Intravenous Daily     Continuous Infusions:   0.9% NaCl with KCl 20 mEq       PRN Meds:.morphine, ondansetron    Allergies  has No Known Allergies. Family History  family history includes Arthritis in an other family member; Asthma in an other family member; Cancer in an other family member; Diabetes in an other family member; High Blood Pressure in an other family member; Stroke in an other family member. Social History   reports that he quit smoking about 3 years ago. His smoking use included cigarettes. He smoked 0.50 packs per day. He has never used smokeless tobacco. He reports current drug use. Drug: Marijuana. He reports that he does not drink alcohol. Smokes 2 joints per day. --  248  --      --  135*  --    K 4.5  --  3.5  --      --  104  --    CO2 18*  --  21  --    BUN 14  --  19  --    CREATININE 0.8*  --  1.0  --    CALCIUM 9.3  --  8.4  --    AST 32  --  19  --    ALT 19  --  19  --    BILITOT 1.2*  --  0.7  --    NITRU  --    < >  --  Negative   COLORU  --    < >  --  Yellow    < > = values in this interval not displayed. Recent Labs     11/20/20  0647   ALKPHOS 56   ALT 19   AST 19   BILITOT 0.7   LABALBU 3.9   LIPASE 134.0*     CBC with Differential:    Lab Results   Component Value Date    WBC 16.1 11/20/2020    RBC 3.13 11/20/2020    HGB 9.8 11/20/2020    HCT 28.5 11/20/2020     11/20/2020    MCV 91.0 11/20/2020    MCH 31.3 11/20/2020    MCHC 34.5 11/20/2020    RDW 12.8 11/20/2020    SEGSPCT 66.5 05/25/2013    LYMPHOPCT 12.0 11/20/2020    MONOPCT 4.0 11/20/2020    EOSPCT 0.6 02/27/2010    BASOPCT 0.0 11/20/2020    MONOSABS 0.6 11/20/2020    LYMPHSABS 1.9 11/20/2020    EOSABS 0.0 11/20/2020    BASOSABS 0.0 11/20/2020    DIFFTYPE Auto 05/25/2013     CMP:    Lab Results   Component Value Date     11/20/2020    K 3.5 11/20/2020    K 4.6 04/13/2018     11/20/2020    CO2 21 11/20/2020    BUN 19 11/20/2020    CREATININE 1.0 11/20/2020    GFRAA >60 11/20/2020    GFRAA >60 05/25/2013    AGRATIO 1.9 11/20/2020    LABGLOM >60 11/20/2020    GLUCOSE 139 11/20/2020    PROT 6.0 11/20/2020    PROT 8.4 02/27/2010    LABALBU 3.9 11/20/2020    CALCIUM 8.4 11/20/2020    BILITOT 0.7 11/20/2020    ALKPHOS 56 11/20/2020    AST 19 11/20/2020    ALT 19 11/20/2020       RADIOLOGY:   I have personally reviewed the following films:    CT scan abd/pelvis: See radiology report  CTA pending    Thank you for the interesting evaluation. Further recommendations to follow. Electronically signed by HENRY Clancy CNP on 11/20/2020 at 9:59 AM    Addendum: stat H&H 7.8/22. 9. Plan for exploratory laparotomy with Dr. Kika Zacarias.  All the risks, benefits and alternatives were discussed with the patient and his girlfriend Giana Delatorre (over the one per pt request) and they agree to proceed with the procedure. Consent signed and witnessed. Stat type and screen. Prepare for OR.   Electronically signed by Francia Hodgkins, APRN - CNP on 11/20/2020 at 11:38 AM

## 2020-11-20 NOTE — PROGRESS NOTES
PT TRANSFERRED TO [facility] VIA CART BY TRANSPORT IN  STABLE CONDITION; PAIN IS 6/10 AND TOLERABLE; FAMILY WAS UPDATED DURING PACU STAY

## 2020-11-20 NOTE — PROGRESS NOTES
Spoke to Dr. Belen Cristobal regarding patient's increased abdominal pain and copious amounts of drainage from the DAMIAN site. Received orders for oxycodone 5 mg every 6 hours for pain less than 5, and oxycodone 10 mg every 6 hours for pain greater than or equal to 5. I also received orders for a STAT hemoglobin and hematocrit.

## 2020-11-20 NOTE — OP NOTE
[address]                 [address] [address]                                OPERATIVE REPORT    PATIENT NAME: Dillon Esqueda                    :        1985  MED REC NO:   [id number]                          ROOM:       11  ACCOUNT NO:   [de-identified]                           ADMIT DATE: 2020  PROVIDER:     Cindi Back MD    DATE OF PROCEDURE:  2020    PREOPERATIVE DIAGNOSIS:  Intra-abdominal hemorrhage status post robotic  cholecystectomy. POSTOPERATIVE DIAGNOSIS:  Intra-abdominal hemorrhage status post robotic  cholecystectomy. PROCEDURE:  Exploratory laparoscopy. ANESTHESIA:  General.    SURGEON:  Cindi Back MD    ESTIMATED BLOOD LOSS:  Minimal.    INDICATIONS:  The patient is a 27-year-old gentleman who presented with  abdominal pain. He was found to have intra-abdominal hematoma and a  drop in his H and H. He is therefore brought for exploration and  identification of the site of bleeding. On imaging, there was concern  that this was coming from the gallbladder fossa. OPERATIVE SUMMARY:  After preoperative evaluation, the patient was  brought in the operating suite and placed in a comfortable supine  position on the operating room table. Monitoring equipment was attached  and general anesthesia was induced. His abdomen was sterilely prepped  and draped and his midline incision as well as his two right-sided  incisions were opened and trocars were placed. He had extensive liquid  and gelatinous hematoma in his abdomen in both upper quadrants as well  as in the pelvis. These were copiously irrigated and evacuated. I  examined the gallbladder fossa, and there was no evidence of bleeding. The clips were intact in the proximal structures. There was no blood  welling up. The gallbladder fossa was cauterized in a few spots for  reinforcement.   The left upper quadrant and pelvis were then again  examined. Once the initial blood and clots were evacuated, there was no  further reaccumulation. The remainder of the abdomen was evacuated, and  again there was no welling up of blood or any sign of active bleeding. Therefore, a drain was placed in the right upper quadrant in the  subhepatic space and brought out through the lateral 5-mm trocar site. It was secured to the skin with silk suture. Remaining trocars were  removed. The umbilical fascial defect was closed with a figure-of-eight  suture of 0-Ethibond. The wounds were injected with Marcaine and the  skin incisions closed with subcuticular sutures of 4-0 Vicryl followed  by benzoin, Steri-Strips, and dry sterile dressings. All sponge,  needle, and instrument counts were correct at the end of the case. The  patient tolerated the procedure well. He was taken to recovery area in  a stable condition.           Maliha Dixon MD    D: 11/20/2020 13:40:26       T: 11/20/2020 13:51:54     [email or web address]  Job#: [id number]     Doc#: [id number]    CC:  Cielo Anglin CNP

## 2020-11-20 NOTE — BRIEF OP NOTE
Brief Postoperative Note      Patient: Jonatan Warren  YOB: 1985  MRN: [id number]    Date of Procedure: 11/20/2020    Pre-Op Diagnosis: POST-OP BLEEDING    Post-Op Diagnosis: Same       Procedure(s):  EXPLORATORY LAPAROSCOPY    Surgeon(s):  Ruth Cohen MD    Assistant:  Surgical Assistant: Amador Tavera    Anesthesia: General    Estimated Blood Loss (mL): Minimal, no active bleeding    Complications: None    Specimens:   * No specimens in log *    Implants:  * No implants in log *      Drains:   Closed/Suction Drain Medial Abdomen Bulb 10 Somali (Active)       NG/OG/NJ/NE Tube Orogastric Center mouth (Active)   Surrounding Skin Intact 11/20/20 1248   Securement device No 11/20/20 1248   Status Suction-low intermittent 11/20/20 1248   Drainage Appearance Bile 11/20/20 1248   Output (mL) 5 ml 11/20/20 1248       Findings: Lots of old blood without active bleeding    Electronically signed by Elsie Tavares MD on 11/20/2020 at 1:13 PM

## 2020-11-20 NOTE — PROGRESS NOTES
4 Eyes Skin Assessment   Four eyes skin assessment completed at this time by David Salvador RN   and [facility]. Assessment revealed: Intact skin except 4 new abdominal surgical sitesl; several small healing scratches on dorsal left hand. The patient is being assessed for  Admission/post op    I agree that 2 RN's have performed a thorough Head to Toe Skin Assessment on the patient. ALL assessment sites listed below have been assessed.        Areas assessed by both nurses:  [x]   Head, Face, and Ears   [x]   Shoulders, Back, and Chest  [x]   Arms, Elbows, and Hands   [x]   Coccyx, Sacrum, and Ischum  [x]   Legs, Feet, and Heels              David Salvador RN

## 2020-11-20 NOTE — ANESTHESIA PRE PROCEDURE
every 6 hours as needed for Pain for up to 5 days. Intended supply: 5 days. Take lowest dose possible to manage pain 20 tablet 0    VENTOLIN  (90 Base) MCG/ACT inhaler          Allergies:  No Known Allergies    Problem List:    Patient Active Problem List   Diagnosis Code    Knee sprain S83. 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 9XXA, S16. 1XXA    Nephrolithiasis N20.0    Nevus D22.9    Pain in both wrists M25.531, M25.532    Pain in thoracic spine M54.6    Passage of bloody stools K92.1    Seborrheic dermatitis L21.9    Symptomatic cholelithiasis K80.20    Acute postoperative pain G89.18       Past Medical History:        Diagnosis Date    Kidney stone     Polyp of colon        Past Surgical History:        Procedure Laterality Date    CHOLECYSTECTOMY, LAPAROSCOPIC N/A 11/19/2020    ROBOTIC CHOLECYSTECTOMY performed by Vinh Vasquez MD at [address] [place] [address]  06/05/2017    polyps    HAND SURGERY  2009    right    OTHER SURGICAL HISTORY      ROBOTIC CHOLECYSTECTOMY (N/A Abdomen)     DE SHOULDER SCOPE BONE SHAVING Right 10/15/2018    RIGHT SHOULDER VIDEO ARTHROSCOPY, DISTAL CLAVICLE EXCISION performed by Tigre Dickson DO at [facility] Right 10/15/2018    TOE SURGERY Left 07/03/2017    matrixectomy fibular schaeffer    WISDOM TOOTH EXTRACTION         Social History:    Social History     Tobacco Use    Smoking status: Former Smoker     Packs/day: 0.50     Types: Cigarettes     Last attempt to quit: 2/18/2017     Years since quitting: 3.7    Smokeless tobacco: Never Used   Substance Use Topics    Alcohol use: No                                Counseling given: Not Answered      Vital Signs (Current):   Vitals:    11/20/20 0840 11/20/20 1101 11/20/20 1106 11/20/20 1136   BP: (!) 151/87  (!) 154/95 (!) 143/87   Pulse: 78  68 68   Resp: (!) 33      Temp:       TempSrc:       SpO2: 100%  100% 100%   Weight:  123 lb (55.8 kg)     Height:  5' 7\" (1.702 m)                                                BP Readings from Last 3 Encounters:   11/20/20 (!) 143/87   11/19/20 (!) 153/97   11/19/20 133/80       NPO Status:                                                                                 BMI:   Wt Readings from Last 3 Encounters:   11/20/20 123 lb (55.8 kg)   11/19/20 123 lb (55.8 kg)   08/16/19 128 lb 1.4 oz (58.1 kg)     Body mass index is 19.26 kg/m². CBC:   Lab Results   Component Value Date    WBC 16.1 11/20/2020    RBC 3.13 11/20/2020    HGB 7.8 11/20/2020    HCT 22.9 11/20/2020    MCV 91.0 11/20/2020    RDW 12.8 11/20/2020     11/20/2020       CMP:   Lab Results   Component Value Date     11/20/2020    K 3.5 11/20/2020    K 4.6 04/13/2018     11/20/2020    CO2 21 11/20/2020    BUN 19 11/20/2020    CREATININE 1.0 11/20/2020    GFRAA >60 11/20/2020    GFRAA >60 05/25/2013    AGRATIO 1.9 11/20/2020    LABGLOM >60 11/20/2020    GLUCOSE 139 11/20/2020    PROT 6.0 11/20/2020    PROT 8.4 02/27/2010    CALCIUM 8.4 11/20/2020    BILITOT 0.7 11/20/2020    ALKPHOS 56 11/20/2020    AST 19 11/20/2020    ALT 19 11/20/2020       POC Tests: No results for input(s): POCGLU, POCNA, POCK, POCCL, POCBUN, POCHEMO, POCHCT in the last 72 hours. Coags:   Lab Results   Component Value Date    PROTIME 12.3 10/18/2016    INR 1.08 10/18/2016    APTT 33.2 10/18/2016       HCG (If Applicable): No results found for: PREGTESTUR, PREGSERUM, HCG, HCGQUANT     ABGs: No results found for: PHART, PO2ART, NCQ6RBS, MTT0OSK, BEART, D6LRURFF     Type & Screen (If Applicable):   No

## 2020-11-20 NOTE — ED PROVIDER NOTES
Pressure Other     Stroke Other      Social History     Socioeconomic History    Marital status: Single     Spouse name: Not on file    Number of children: Not on file    Years of education: Not on file    Highest education level: Not on file   Occupational History    Not on file   Social Needs    Financial resource strain: Not on file    Food insecurity     Worry: Not on file     Inability: Not on file    Transportation needs     Medical: Not on file     Non-medical: Not on file   Tobacco Use    Smoking status: Former Smoker     Packs/day: 0.50     Types: Cigarettes     Last attempt to quit: 2/18/2017     Years since quitting: 3.7    Smokeless tobacco: Never Used   Substance and Sexual Activity    Alcohol use: No    Drug use: Yes     Types: Marijuana     Comment: occasional    Sexual activity: Yes     Partners: Female   Lifestyle    Physical activity     Days per week: Not on file     Minutes per session: Not on file    Stress: Not on file   Relationships    Social connections     Talks on phone: Not on file     Gets together: Not on file     Attends Jehovah's witness service: Not on file     Active member of club or organization: Not on file     Attends meetings of clubs or organizations: Not on file     Relationship status: Not on file    Intimate partner violence     Fear of current or ex partner: Not on file     Emotionally abused: Not on file     Physically abused: Not on file     Forced sexual activity: Not on file   Other Topics Concern    Not on file   Social History Narrative    Not on file     Current Facility-Administered Medications   Medication Dose Route Frequency Provider Last Rate Last Dose    ondansetron (ZOFRAN) injection 4 mg  4 mg Intravenous Q6H PRN HENRY Oliveira - CNP   4 mg at 11/20/20 0835    0.9% NaCl with KCl 20 mEq infusion   Intravenous Continuous Anshu Back  mL/hr at 11/21/20 0106      pantoprazole (PROTONIX) injection 40 mg  40 mg Intravenous Daily Alyce Mclainl, APRN - CNP   40 mg at 11/20/20 1012    sodium chloride flush 0.9 % injection 10 mL  10 mL Intravenous 2 times per day Linda Thompson, APRN - CNP   10 mL at 11/20/20 2106    sodium chloride flush 0.9 % injection 10 mL  10 mL Intravenous PRN Alyce Ty Dingwall, APRN - CNP        ondansetron (ZOFRAN-ODT) disintegrating tablet 4 mg  4 mg Oral Q8H PRN Alyce Ty Dingwall, APRN - CNP        Or    ondansetron (ZOFRAN) injection 4 mg  4 mg Intravenous Q6H PRN Alyce Crawford Dingwall, APRN - CNP        pantoprazole (PROTONIX) injection 40 mg  40 mg Intravenous Daily Linda Thompson, APRN - CNP   40 mg at 11/21/20 0857    And    sodium chloride flush 0.9 % injection 10 mL  10 mL Intravenous Daily Alyce Chou, APRN - CNP   10 mL at 11/21/20 0857    HYDROmorphone (DILAUDID) injection 1 mg  1 mg Intravenous Q2H PRN Martha Valdez MD   1 mg at 11/21/20 0857    oxyCODONE (ROXICODONE) immediate release tablet 5 mg  5 mg Oral Q6H PRN Nelda Ayala MD        Or    oxyCODONE (ROXICODONE) immediate release tablet 10 mg  10 mg Oral Q6H PRN Nelda Ayala MD   10 mg at 11/21/20 1119    acetaminophen (TYLENOL) tablet 650 mg  650 mg Oral Q6H PRN Nelda Ayala MD   650 mg at 11/20/20 1900     No Known Allergies    REVIEW OF SYSTEMS  10 systems reviewed, pertinent positives per HPI otherwise noted to be negative. PHYSICAL EXAM  /69   Pulse 87   Temp 99 °F (37.2 °C) (Oral)   Resp 16   Ht 5' 7\" (1.702 m)   Wt 123 lb (55.8 kg)   SpO2 100%   BMI 19.26 kg/m²    GENERAL APPEARANCE: Awake. Alert. In moderate distress  HENT: Normocephalic. Atraumatic. CN  2-12 grossly intact. HEART/CHEST: RRR. No murmurs appreciated  LUNGS: Respirations unlabored. Speaking comfortably in full sentences. CTAB. ABDOMEN: Laparoscopic incisions healing well with Steri-Strips intact. Diffuse tenderness to palpation throughout all 4 quadrants. No guarding or rebound.   EXTREMITIES: No gross deformities. Moving all extremities. All extremities neurovascularly intact. SKIN: Warm and dry. No acute rashes. NEUROLOGICAL: Alert and oriented. CN's 2-12 intact. No gross facial drooping. Strength 5/5, sensation intact. PSYCHIATRIC: Normal mood and affect.     LABS  Results for orders placed or performed during the hospital encounter of 11/20/20   CBC Auto Differential   Result Value Ref Range    WBC 16.1 (H) 4.0 - 11.0 K/uL    RBC 3.13 (L) 4.20 - 5.90 M/uL    Hemoglobin 9.8 (L) 13.5 - 17.5 g/dL    Hematocrit 28.5 (L) 40.5 - 52.5 %    MCV 91.0 80.0 - 100.0 fL    MCH 31.3 26.0 - 34.0 pg    MCHC 34.5 31.0 - 36.0 g/dL    RDW 12.8 12.4 - 15.4 %    Platelets 101 731 - 899 K/uL    MPV 7.9 5.0 - 10.5 fL    PLATELET SLIDE REVIEW Adequate     SLIDE REVIEW see below     Neutrophils % 84.0 %    Lymphocytes % 12.0 %    Monocytes % 4.0 %    Eosinophils % 0.0 %    Basophils % 0.0 %    Neutrophils Absolute 13.5 (H) 1.7 - 7.7 K/uL    Lymphocytes Absolute 1.9 1.0 - 5.1 K/uL    Monocytes Absolute 0.6 0.0 - 1.3 K/uL    Eosinophils Absolute 0.0 0.0 - 0.6 K/uL    Basophils Absolute 0.0 0.0 - 0.2 K/uL   Comprehensive Metabolic Panel   Result Value Ref Range    Sodium 135 (L) 136 - 145 mmol/L    Potassium 3.5 3.5 - 5.1 mmol/L    Chloride 104 99 - 110 mmol/L    CO2 21 21 - 32 mmol/L    Anion Gap 10 3 - 16    Glucose 139 (H) 70 - 99 mg/dL    BUN 19 7 - 20 mg/dL    CREATININE 1.0 0.9 - 1.3 mg/dL    GFR Non-African American >60 >60    GFR African American >60 >60    Calcium 8.4 8.3 - 10.6 mg/dL    Total Protein 6.0 (L) 6.4 - 8.2 g/dL    Alb 3.9 3.4 - 5.0 g/dL    Albumin/Globulin Ratio 1.9 1.1 - 2.2    Total Bilirubin 0.7 0.0 - 1.0 mg/dL    Alkaline Phosphatase 56 40 - 129 U/L    ALT 19 10 - 40 U/L    AST 19 15 - 37 U/L    Globulin 2.1 g/dL   Lipase   Result Value Ref Range    Lipase 134.0 (H) 13.0 - 60.0 U/L   Troponin   Result Value Ref Range    Troponin <0.01 <0.01 ng/mL   Urinalysis Reflex to Culture    Specimen: Urine, clean catch Result Value Ref Range    Color, UA Yellow Straw/Yellow    Clarity, UA Clear Clear    Glucose, Ur 250 (A) Negative mg/dL    Bilirubin Urine SMALL (A) Negative    Ketones, Urine 15 (A) Negative mg/dL    Specific Gravity, UA >=1.030 1.005 - 1.030    Blood, Urine Negative Negative    pH, UA 6.0 5.0 - 8.0    Protein, UA TRACE (A) Negative mg/dL    Urobilinogen, Urine 0.2 <2.0 E.U./dL    Nitrite, Urine Negative Negative    Leukocyte Esterase, Urine Negative Negative    Microscopic Examination YES     Urine Type NotGiven     Urine Reflex to Culture Not Indicated    Lactic acid, plasma   Result Value Ref Range    Lactic Acid 1.4 0.4 - 2.0 mmol/L   Microscopic Urinalysis   Result Value Ref Range    Mucus, UA 3+ (A) None Seen /LPF    WBC, UA 6-9 (A) 0 - 5 /HPF    RBC, UA 0-2 0 - 4 /HPF   HEMOGLOBIN AND HEMATOCRIT, BLOOD   Result Value Ref Range    Hemoglobin 7.8 (L) 13.5 - 17.5 g/dL    Hematocrit 22.9 (L) 40.5 - 52.5 %   COVID-19   Result Value Ref Range    SARS-CoV-2, NAAT Not Detected Not Detected   CBC auto differential   Result Value Ref Range    WBC 12.5 (H) 4.0 - 11.0 K/uL    RBC 2.32 (L) 4.20 - 5.90 M/uL    Hemoglobin 7.1 (L) 13.5 - 17.5 g/dL    Hematocrit 21.4 (L) 40.5 - 52.5 %    MCV 92.4 80.0 - 100.0 fL    MCH 30.9 26.0 - 34.0 pg    MCHC 33.4 31.0 - 36.0 g/dL    RDW 13.2 12.4 - 15.4 %    Platelets 295 603 - 535 K/uL    MPV 7.7 5.0 - 10.5 fL    Neutrophils % 75.9 %    Lymphocytes % 13.4 %    Monocytes % 10.6 %    Eosinophils % 0.0 %    Basophils % 0.1 %    Neutrophils Absolute 9.5 (H) 1.7 - 7.7 K/uL    Lymphocytes Absolute 1.7 1.0 - 5.1 K/uL    Monocytes Absolute 1.3 0.0 - 1.3 K/uL    Eosinophils Absolute 0.0 0.0 - 0.6 K/uL    Basophils Absolute 0.0 0.0 - 0.2 K/uL   Protime-INR   Result Value Ref Range    Protime 15.3 (H) 10.0 - 13.2 sec    INR 1.32 (H) 0.86 - 1.14   Comprehensive Metabolic Panel w/ Reflex to MG   Result Value Ref Range    Sodium 134 (L) 136 - 145 mmol/L    Potassium reflex Magnesium 3.8 3.5 - 5.1 mmol/L    Chloride 106 99 - 110 mmol/L    CO2 20 (L) 21 - 32 mmol/L    Anion Gap 8 3 - 16    Glucose 97 70 - 99 mg/dL    BUN 11 7 - 20 mg/dL    CREATININE 0.7 (L) 0.9 - 1.3 mg/dL    GFR Non-African American >60 >60    GFR African American >60 >60    Calcium 7.8 (L) 8.3 - 10.6 mg/dL    Total Protein 5.4 (L) 6.4 - 8.2 g/dL    Alb 3.6 3.4 - 5.0 g/dL    Albumin/Globulin Ratio 2.0 1.1 - 2.2    Total Bilirubin 0.4 0.0 - 1.0 mg/dL    Alkaline Phosphatase 48 40 - 129 U/L    ALT 28 10 - 40 U/L    AST 24 15 - 37 U/L    Globulin 1.8 g/dL   Lipase   Result Value Ref Range    Lipase 28.0 13.0 - 60.0 U/L   Hemoglobin and hematocrit, blood   Result Value Ref Range    Hemoglobin 8.1 (L) 13.5 - 17.5 g/dL    Hematocrit 23.5 (L) 40.5 - 52.5 %   TYPE AND SCREEN   Result Value Ref Range    ABO/Rh A POS     Antibody Screen NEG        I have reviewed all labs for this visit. RADIOLOGY  Ct Abdomen Pelvis W Iv Contrast Additional Contrast? None    Addendum Date: 11/20/2020    ADDENDUM: The free intraperitoneal fluid/ascites demonstrates density of approximately 35 Hounsfield units which is higher density than simple fluid. A component of hemoperitoneum cannot be excluded. If there is clinical suspicion for bile leak, further evaluation with HIDA scan can be considered. Result Date: 11/20/2020  EXAMINATION: CT OF THE ABDOMEN AND PELVIS WITH CONTRAST 11/20/2020 7:49 am TECHNIQUE: CT of the abdomen and pelvis was performed with the administration of intravenous contrast. Multiplanar reformatted images are provided for review. Dose modulation, iterative reconstruction, and/or weight based adjustment of the mA/kV was utilized to reduce the radiation dose to as low as reasonably achievable. COMPARISON: None. HISTORY: ORDERING SYSTEM PROVIDED HISTORY: post op pain radiating to the shoulder. cholecystectomy yesterday TECHNOLOGIST PROVIDED HISTORY: Reason for exam:->post op pain radiating to the shoulder.  cholecystectomy yesterday Additional Contrast?->None Reason for Exam: s/p gallbladder sx yesterday,   patient woke up this am with extreme pain after anestesia wore off and he's out of pain meds. pain is all over abdomen and into his testicles Acuity: Acute Type of Exam: Initial FINDINGS: Lower Chest: Lung bases are clear without pulmonary nodules, masses, effusions, or foci of airspace disease. The base of the heart is normal in size without pericardial fluid collection. Organs: The liver, pancreas, and spleen are grossly unremarkable. Large volume ascites. Low-density lesion within the left hepatic lobe with subtle peripheral enhancement, most compatible with a hemangioma. Status post cholecystectomy. GI/Bowel: Bowel is without evidence for obstruction or inflammation. No free intraperitoneal air or fluid noted. Small bowel loops are normal in caliber. No definite hiatal hernia. Pelvis:  No evidence for free fluid. Peritoneum/Retroperitoneum: The adrenal glands are normal in size and configuration bilaterally. Kidneys perfuse and excrete in a symmetric fashion and ureters are not obstructed. Urinary bladder is unremarkable. Bones/Soft Tissues: Osseous structures are intact without evidence for acute fracture or dislocation. No definite lytic or blastic lesions. Overlying soft tissues are unremarkable. Vasculature: The abdominal aorta is normal in caliber. No discrete and aneurysm or dissection. No lymphadenopathy within the abdomen or pelvis. Status post cholecystectomy. Large volume ascites within the abdomen pelvis. No well-formed or drainable fluid collection identified.      Us Gallbladder Ruq    Result Date: 11/19/2020  EXAMINATION: RIGHT UPPER QUADRANT ULTRASOUND 11/19/2020 4:03 am COMPARISON: CT April 13, 2018 HISTORY: ORDERING SYSTEM PROVIDED HISTORY: ruq pain TECHNOLOGIST PROVIDED HISTORY: Reason for exam:->ruq pain Reason for Exam: ruq pain Acuity: Unknown Type of Exam: Unknown Right upper quadrant abdominal pain FINDINGS: LIVER:  The liver demonstrates normal echogenicity without evidence of intrahepatic biliary ductal dilatation. BILIARY SYSTEM:  Shadowing echogenic foci and layering sludge. Gallbladder wall thickening measuring approximately 4 mm. Negative sonographic Campos sign. Common bile duct is within normal limits measuring 4 mm. RIGHT KIDNEY: The right kidney is grossly unremarkable without evidence of hydronephrosis. 10.6 x 4.4 x 5.3 cm. Small simple cysts, largest measuring up to 2.4 cm. PANCREAS:  Visualized portions of the pancreas are unremarkable. OTHER: No evidence of right upper quadrant ascites. Cholelithiasis with mild gallbladder wall thickening. No pericholecystic fluid. Negative sonographic Campos sign. Xr Chest Portable    Result Date: 11/20/2020  EXAMINATION: ONE XRAY VIEW OF THE CHEST 11/20/2020 7:40 am COMPARISON: 11/19/2020 HISTORY: ORDERING SYSTEM PROVIDED HISTORY: cough TECHNOLOGIST PROVIDED HISTORY: Reason for exam:->cough Reason for Exam: post op abd pain Acuity: Acute Type of Exam: Initial FINDINGS: Single view of the chest was obtained. No confluent focal pulmonary opacities to suggest acute airspace disease. No evidence of pleural effusion or pneumothorax. Cardiac and mediastinal silhouettes are within normal limits. No acute cardiopulmonary disease. Xr Chest Portable    Result Date: 11/19/2020  EXAMINATION: ONE XRAY VIEW OF THE CHEST 11/19/2020 2:54 am COMPARISON: August 7, 2019 HISTORY: ORDERING SYSTEM PROVIDED HISTORY: ruq pain TECHNOLOGIST PROVIDED HISTORY: Reason for exam:->ruq pain Reason for Exam: right upper chest pain Acuity: Acute Type of Exam: Initial Right chest pain FINDINGS: Cardiomediastinal silhouette within normal limits. Lungs and costophrenic sulci are clear. No pneumothorax or subdiaphragmatic free air. No acute osseous abnormality identified. No radiographic evidence of acute cardiopulmonary disease.      Cta Abdomen Pelvis W Contrast    Result Date: 11/20/2020  EXAMINATION: CTA OF THE ABDOMEN AND PELVIS WITH CONTRAST 11/20/2020 10:38 am: TECHNIQUE: CTA of the abdomen and pelvis was performed with the administration of intravenous contrast. Multiplanar reformatted images are provided for review. MIP images are provided for review. Dose modulation, iterative reconstruction, and/or weight based adjustment of the mA/kV was utilized to reduce the radiation dose to as low as reasonably achievable. COMPARISON: CT abdomen and pelvis earlier today. HISTORY: ORDERING SYSTEM PROVIDED HISTORY: intraabdominal bleed, Hgb down 5 grams TECHNOLOGIST PROVIDED HISTORY: Reason for exam:->intraabdominal bleed, Hgb down 5 grams Reason for Exam: gi bleed? Acuity: Acute Type of Exam: Subsequent/Follow-up FINDINGS: CTA ABDOMEN: On coronal image 51 of series 601 and axial image 92 of series 2 there is in ill-defined focus of contrast measuring 6 mm within the gallbladder fossa at the surgery site. High-density fluid has increased adjacent to the liver since the examination 2 hours ago. There also is increased fluid in the right lower quadrant and right pericolic gutter. CTA PELVIS: Increased hemorrhagic fluid in the right-sided pelvis. Partially clotted fluid is present in the posterior pelvis, obscured by dense contrast in the bladder at this time, better demonstrated on the CT 2 hours ago. Active hemorrhagic extravasation from a small artery in the gallbladder fossa, favored to be a branch of the cystic artery. No hemorrhage elsewhere including the trocar sites within the anterior abdominal wall. Critical results were discussed by Dr. Hansel Rodas. MD Al to [address] on 11/20/2020 at 11:26. ED COURSE/MDM  Patient seen and evaluated. At presentation, patient was awake, alert, afebrile, hemodynamically stable, and satting well on room air. Exam remarkable for well-healing laparoscopic incisions covered with Steri-Strips.   Patient had tenderness to palpation throughout all 4 quadrants without guarding or rebound. Differential diagnosis includes expected postop pain, postop bleeding, or other postop complication. CBC, CMP, lipase, troponin, type and screen ordered. Patient given morphine and Zofran as needed for symptoms. Per chart review, patient had cholecystectomy yesterday. Bedside ultrasound showed free fluid in the left upper quadrant. Given this, patient surgeon consulted who recommended obtaining CT abdomen pelvis with contrast. Labs remarkable for hemoglobin of 9.8, which is changed from 14.3 one day ago. CT showed large volume ascites. Radiologist was called to inquire about the free fluid seen in the abdomen and said there was density to the fluid but unable to tell seroma vs blood. No active bleed seen per radiologist. Surgery called back to provide update on the work up. Patient admitted to surgery.      During the patient's ED course, the patient was given:  Medications   ondansetron (ZOFRAN) injection 4 mg (4 mg Intravenous Given 11/20/20 0835)   0.9% NaCl with KCl 20 mEq infusion ( Intravenous New Bag 11/21/20 0106)   pantoprazole (PROTONIX) injection 40 mg ( Intravenous Canceled Entry 11/21/20 0733)   sodium chloride flush 0.9 % injection 10 mL ( Intravenous Canceled Entry 11/21/20 0733)   sodium chloride flush 0.9 % injection 10 mL (has no administration in time range)   ondansetron (ZOFRAN-ODT) disintegrating tablet 4 mg (has no administration in time range)     Or   ondansetron (ZOFRAN) injection 4 mg (has no administration in time range)   pantoprazole (PROTONIX) injection 40 mg (40 mg Intravenous Given 11/21/20 0857)     And   sodium chloride flush 0.9 % injection 10 mL (10 mLs Intravenous Given 11/21/20 0857)   HYDROmorphone (DILAUDID) injection 1 mg (1 mg Intravenous Given 11/21/20 0857)   oxyCODONE (ROXICODONE) immediate release tablet 5 mg ( Oral See Alternative 11/21/20 1119)     Or   oxyCODONE (ROXICODONE) immediate release tablet 10 mg (10 mg Oral Given 11/21/20 1119)   acetaminophen (TYLENOL) tablet 650 mg (650 mg Oral Given 11/20/20 1900)   0.9 % sodium chloride bolus (0 mLs Intravenous Stopped 11/20/20 1013)   iopamidol (ISOVUE-370) 76 % injection 75 mL (75 mLs Intravenous Given 11/20/20 0813)   iopamidol (ISOVUE-370) 76 % injection 85 mL (85 mLs Intravenous Given 11/20/20 1039)   lactated ringers infusion (  Override pull for Anesthesia 11/20/20 1200)   sodium chloride 0.9 % irrigation (500 mLs Irrigation New Bag 11/20/20 1218)        CLINICAL IMPRESSION  1. Pain, postoperative, acute    2. Nausea    3. Free fluid in pelvis        Blood pressure 128/69, pulse 87, temperature 99 °F (37.2 °C), temperature source Oral, resp. rate 16, height 5' 7\" (1.702 m), weight 123 lb (55.8 kg), SpO2 100 %. Bere Knapp was admitted to the hospital.       Patient was given scripts for the following medications. I counseled patient how to take these medications. Current Discharge Medication List          Follow-up with:  No follow-up provider specified. DISCLAIMER: This chart was created using Dragon dictation software. Efforts were made by me to ensure accuracy, however some errors may be present due to limitations of this technology and occasionally words are not transcribed correctly.         Nemo Lares MD  11/21/20 4453

## 2020-11-21 LAB
A/G RATIO: 2 (ref 1.1–2.2)
ALBUMIN SERPL-MCNC: 3.6 G/DL (ref 3.4–5)
ALP BLD-CCNC: 48 U/L (ref 40–129)
ALT SERPL-CCNC: 28 U/L (ref 10–40)
ANION GAP SERPL CALCULATED.3IONS-SCNC: 8 MMOL/L (ref 3–16)
AST SERPL-CCNC: 24 U/L (ref 15–37)
BASOPHILS ABSOLUTE: 0 K/UL (ref 0–0.2)
BASOPHILS RELATIVE PERCENT: 0.1 %
BILIRUB SERPL-MCNC: 0.4 MG/DL (ref 0–1)
BUN BLDV-MCNC: 11 MG/DL (ref 7–20)
CALCIUM SERPL-MCNC: 7.8 MG/DL (ref 8.3–10.6)
CHLORIDE BLD-SCNC: 106 MMOL/L (ref 99–110)
CO2: 20 MMOL/L (ref 21–32)
CREAT SERPL-MCNC: 0.7 MG/DL (ref 0.9–1.3)
EOSINOPHILS ABSOLUTE: 0 K/UL (ref 0–0.6)
EOSINOPHILS RELATIVE PERCENT: 0 %
GFR AFRICAN AMERICAN: >60
GFR NON-AFRICAN AMERICAN: >60
GLOBULIN: 1.8 G/DL
GLUCOSE BLD-MCNC: 97 MG/DL (ref 70–99)
HCT VFR BLD CALC: 21.4 % (ref 40.5–52.5)
HEMOGLOBIN: 7.1 G/DL (ref 13.5–17.5)
INR BLD: 1.32 (ref 0.86–1.14)
LIPASE: 28 U/L (ref 13–60)
LYMPHOCYTES ABSOLUTE: 1.7 K/UL (ref 1–5.1)
LYMPHOCYTES RELATIVE PERCENT: 13.4 %
MCH RBC QN AUTO: 30.9 PG (ref 26–34)
MCHC RBC AUTO-ENTMCNC: 33.4 G/DL (ref 31–36)
MCV RBC AUTO: 92.4 FL (ref 80–100)
MONOCYTES ABSOLUTE: 1.3 K/UL (ref 0–1.3)
MONOCYTES RELATIVE PERCENT: 10.6 %
NEUTROPHILS ABSOLUTE: 9.5 K/UL (ref 1.7–7.7)
NEUTROPHILS RELATIVE PERCENT: 75.9 %
PDW BLD-RTO: 13.2 % (ref 12.4–15.4)
PLATELET # BLD: 155 K/UL (ref 135–450)
PMV BLD AUTO: 7.7 FL (ref 5–10.5)
POTASSIUM REFLEX MAGNESIUM: 3.8 MMOL/L (ref 3.5–5.1)
PROTHROMBIN TIME: 15.3 SEC (ref 10–13.2)
RBC # BLD: 2.32 M/UL (ref 4.2–5.9)
SODIUM BLD-SCNC: 134 MMOL/L (ref 136–145)
TOTAL PROTEIN: 5.4 G/DL (ref 6.4–8.2)
WBC # BLD: 12.5 K/UL (ref 4–11)

## 2020-11-21 PROCEDURE — 99024 POSTOP FOLLOW-UP VISIT: CPT | Performed by: SURGERY

## 2020-11-21 PROCEDURE — 2580000003 HC RX 258: Performed by: NURSE PRACTITIONER

## 2020-11-21 PROCEDURE — 6370000000 HC RX 637 (ALT 250 FOR IP): Performed by: SURGERY

## 2020-11-21 PROCEDURE — G0378 HOSPITAL OBSERVATION PER HR: HCPCS

## 2020-11-21 PROCEDURE — 96375 TX/PRO/DX INJ NEW DRUG ADDON: CPT

## 2020-11-21 PROCEDURE — 83690 ASSAY OF LIPASE: CPT

## 2020-11-21 PROCEDURE — 1200000000 HC SEMI PRIVATE

## 2020-11-21 PROCEDURE — 80053 COMPREHEN METABOLIC PANEL: CPT

## 2020-11-21 PROCEDURE — 96361 HYDRATE IV INFUSION ADD-ON: CPT

## 2020-11-21 PROCEDURE — 85610 PROTHROMBIN TIME: CPT

## 2020-11-21 PROCEDURE — 85025 COMPLETE CBC W/AUTO DIFF WBC: CPT

## 2020-11-21 PROCEDURE — 6360000002 HC RX W HCPCS: Performed by: NURSE PRACTITIONER

## 2020-11-21 PROCEDURE — 96376 TX/PRO/DX INJ SAME DRUG ADON: CPT

## 2020-11-21 PROCEDURE — 36415 COLL VENOUS BLD VENIPUNCTURE: CPT

## 2020-11-21 PROCEDURE — 6360000002 HC RX W HCPCS: Performed by: SURGERY

## 2020-11-21 PROCEDURE — 96374 THER/PROPH/DIAG INJ IV PUSH: CPT

## 2020-11-21 PROCEDURE — C9113 INJ PANTOPRAZOLE SODIUM, VIA: HCPCS | Performed by: NURSE PRACTITIONER

## 2020-11-21 RX ADMIN — HYDROMORPHONE HYDROCHLORIDE 1 MG: 1 INJECTION, SOLUTION INTRAMUSCULAR; INTRAVENOUS; SUBCUTANEOUS at 21:15

## 2020-11-21 RX ADMIN — HYDROMORPHONE HYDROCHLORIDE 1 MG: 1 INJECTION, SOLUTION INTRAMUSCULAR; INTRAVENOUS; SUBCUTANEOUS at 08:57

## 2020-11-21 RX ADMIN — HYDROMORPHONE HYDROCHLORIDE 1 MG: 1 INJECTION, SOLUTION INTRAMUSCULAR; INTRAVENOUS; SUBCUTANEOUS at 01:05

## 2020-11-21 RX ADMIN — OXYCODONE HYDROCHLORIDE 10 MG: 5 TABLET ORAL at 11:19

## 2020-11-21 RX ADMIN — Medication 10 ML: at 21:16

## 2020-11-21 RX ADMIN — HYDROMORPHONE HYDROCHLORIDE 1 MG: 1 INJECTION, SOLUTION INTRAMUSCULAR; INTRAVENOUS; SUBCUTANEOUS at 14:15

## 2020-11-21 RX ADMIN — OXYCODONE HYDROCHLORIDE 10 MG: 5 TABLET ORAL at 18:54

## 2020-11-21 RX ADMIN — HYDROMORPHONE HYDROCHLORIDE 1 MG: 1 INJECTION, SOLUTION INTRAMUSCULAR; INTRAVENOUS; SUBCUTANEOUS at 04:37

## 2020-11-21 RX ADMIN — POTASSIUM CHLORIDE AND SODIUM CHLORIDE: 900; 150 INJECTION, SOLUTION INTRAVENOUS at 21:11

## 2020-11-21 RX ADMIN — POTASSIUM CHLORIDE AND SODIUM CHLORIDE: 900; 150 INJECTION, SOLUTION INTRAVENOUS at 01:06

## 2020-11-21 RX ADMIN — HYDROMORPHONE HYDROCHLORIDE 1 MG: 1 INJECTION, SOLUTION INTRAMUSCULAR; INTRAVENOUS; SUBCUTANEOUS at 17:50

## 2020-11-21 RX ADMIN — PANTOPRAZOLE SODIUM 40 MG: 40 INJECTION, POWDER, FOR SOLUTION INTRAVENOUS at 08:57

## 2020-11-21 RX ADMIN — Medication 10 ML: at 08:57

## 2020-11-21 ASSESSMENT — PAIN DESCRIPTION - PAIN TYPE
TYPE: SURGICAL PAIN

## 2020-11-21 ASSESSMENT — PAIN DESCRIPTION - LOCATION
LOCATION: ABDOMEN

## 2020-11-21 ASSESSMENT — PAIN SCALES - GENERAL
PAINLEVEL_OUTOF10: 6
PAINLEVEL_OUTOF10: 8
PAINLEVEL_OUTOF10: 5
PAINLEVEL_OUTOF10: 7
PAINLEVEL_OUTOF10: 9
PAINLEVEL_OUTOF10: 9
PAINLEVEL_OUTOF10: 10
PAINLEVEL_OUTOF10: 0
PAINLEVEL_OUTOF10: 10
PAINLEVEL_OUTOF10: 10
PAINLEVEL_OUTOF10: 8

## 2020-11-21 ASSESSMENT — PAIN DESCRIPTION - DESCRIPTORS
DESCRIPTORS: PRESSURE
DESCRIPTORS: PRESSURE

## 2020-11-21 ASSESSMENT — PAIN DESCRIPTION - ORIENTATION: ORIENTATION: MID

## 2020-11-21 NOTE — PROGRESS NOTES
Drainage from DAMIAN slowing down. Needed dressing change twice. Pt used pain med x 3 with relief. See I & O. Resting in bed.

## 2020-11-21 NOTE — PROGRESS NOTES
PRN dilaudid given for postop pain 10/10. DAMIAN drain emptied. Pt c/o pain to IV in RAC, catheter had pulled loose, removed catheter with no complications.

## 2020-11-21 NOTE — PROGRESS NOTES
So far in the shift, DAMIAN dressing needed changing twice. DAMIAN drainage 7.5 & 25 cc. Pt passing flatus x3.

## 2020-11-21 NOTE — PROGRESS NOTES
[facility] GENERAL SURGERY DAILY PROGRESS NOTE    SUBJECTIVE: Awake, alert    OBJECTIVE: CURRENT VITALS:  /69   Pulse 87   Temp 99 °F (37.2 °C) (Oral)   Resp 16   Ht 5' 7\" (1.702 m)   Wt 123 lb (55.8 kg)   SpO2 100%   BMI 19.26 kg/m²          ABD: Soft    LABS:    CBC:   Recent Labs     11/19/20  0244 11/20/20  0647 11/20/20  1101 11/20/20  1840 11/21/20  0652   WBC 9.8 16.1*  --   --  12.5*   RBC 4.54 3.13*  --   --  2.32*   HGB 14.3 9.8* 7.8* 8.1* 7.1*   HCT 41.3 28.5* 22.9* 23.5* 21.4*   MCV 91.1 91.0  --   --  92.4   RDW 12.9 12.8  --   --  13.2    248  --   --  155     BMP:   Recent Labs     11/19/20  0244 11/20/20  0647 11/21/20  0652    135* 134*   K 4.5 3.5 3.8    104 106   CO2 18* 21 20*   BUN 14 19 11   CREATININE 0.8* 1.0 0.7*               ASSESSMENT:   POD 2 robotic andra  POD 1 laparoscopy for hemoperitoneum      PLAN:   OOB  IS  Ambulate  Follow H/H         [address] [facility]

## 2020-11-22 VITALS
SYSTOLIC BLOOD PRESSURE: 124 MMHG | TEMPERATURE: 98.3 F | DIASTOLIC BLOOD PRESSURE: 73 MMHG | BODY MASS INDEX: 19.3 KG/M2 | HEIGHT: 67 IN | OXYGEN SATURATION: 100 % | WEIGHT: 123 LBS | RESPIRATION RATE: 16 BRPM | HEART RATE: 65 BPM

## 2020-11-22 LAB
BASOPHILS ABSOLUTE: 0 K/UL (ref 0–0.2)
BASOPHILS RELATIVE PERCENT: 0.3 %
EOSINOPHILS ABSOLUTE: 0 K/UL (ref 0–0.6)
EOSINOPHILS RELATIVE PERCENT: 0.3 %
GLUCOSE BLD-MCNC: 125 MG/DL (ref 70–99)
GLUCOSE BLD-MCNC: 166 MG/DL (ref 70–99)
HCT VFR BLD CALC: 22.3 % (ref 40.5–52.5)
HEMOGLOBIN: 7.8 G/DL (ref 13.5–17.5)
LYMPHOCYTES ABSOLUTE: 1.6 K/UL (ref 1–5.1)
LYMPHOCYTES RELATIVE PERCENT: 19.7 %
MCH RBC QN AUTO: 31.6 PG (ref 26–34)
MCHC RBC AUTO-ENTMCNC: 34.9 G/DL (ref 31–36)
MCV RBC AUTO: 90.4 FL (ref 80–100)
MONOCYTES ABSOLUTE: 0.8 K/UL (ref 0–1.3)
MONOCYTES RELATIVE PERCENT: 9.9 %
NEUTROPHILS ABSOLUTE: 5.7 K/UL (ref 1.7–7.7)
NEUTROPHILS RELATIVE PERCENT: 69.8 %
PDW BLD-RTO: 13.2 % (ref 12.4–15.4)
PERFORMED ON: ABNORMAL
PERFORMED ON: ABNORMAL
PLATELET # BLD: 163 K/UL (ref 135–450)
PMV BLD AUTO: 7.8 FL (ref 5–10.5)
RBC # BLD: 2.47 M/UL (ref 4.2–5.9)
WBC # BLD: 8.2 K/UL (ref 4–11)

## 2020-11-22 PROCEDURE — 85025 COMPLETE CBC W/AUTO DIFF WBC: CPT

## 2020-11-22 PROCEDURE — 36415 COLL VENOUS BLD VENIPUNCTURE: CPT

## 2020-11-22 PROCEDURE — 6360000002 HC RX W HCPCS: Performed by: NURSE PRACTITIONER

## 2020-11-22 PROCEDURE — 6370000000 HC RX 637 (ALT 250 FOR IP): Performed by: SURGERY

## 2020-11-22 PROCEDURE — 96376 TX/PRO/DX INJ SAME DRUG ADON: CPT

## 2020-11-22 PROCEDURE — 2580000003 HC RX 258: Performed by: NURSE PRACTITIONER

## 2020-11-22 PROCEDURE — C9113 INJ PANTOPRAZOLE SODIUM, VIA: HCPCS | Performed by: NURSE PRACTITIONER

## 2020-11-22 PROCEDURE — 99024 POSTOP FOLLOW-UP VISIT: CPT | Performed by: SURGERY

## 2020-11-22 PROCEDURE — G0378 HOSPITAL OBSERVATION PER HR: HCPCS

## 2020-11-22 PROCEDURE — 96361 HYDRATE IV INFUSION ADD-ON: CPT

## 2020-11-22 PROCEDURE — 6360000002 HC RX W HCPCS: Performed by: SURGERY

## 2020-11-22 RX ORDER — OXYCODONE HYDROCHLORIDE 5 MG/1
5 TABLET ORAL EVERY 6 HOURS PRN
Qty: 24 TABLET | Refills: 0 | Status: SHIPPED | OUTPATIENT
Start: 2020-11-22 | End: 2020-11-24 | Stop reason: SDUPTHER

## 2020-11-22 RX ADMIN — HYDROMORPHONE HYDROCHLORIDE 1 MG: 1 INJECTION, SOLUTION INTRAMUSCULAR; INTRAVENOUS; SUBCUTANEOUS at 10:48

## 2020-11-22 RX ADMIN — Medication 10 ML: at 07:31

## 2020-11-22 RX ADMIN — POTASSIUM CHLORIDE AND SODIUM CHLORIDE: 900; 150 INJECTION, SOLUTION INTRAVENOUS at 07:30

## 2020-11-22 RX ADMIN — HYDROMORPHONE HYDROCHLORIDE 1 MG: 1 INJECTION, SOLUTION INTRAMUSCULAR; INTRAVENOUS; SUBCUTANEOUS at 07:31

## 2020-11-22 RX ADMIN — PANTOPRAZOLE SODIUM 40 MG: 40 INJECTION, POWDER, FOR SOLUTION INTRAVENOUS at 07:31

## 2020-11-22 RX ADMIN — HYDROMORPHONE HYDROCHLORIDE 1 MG: 1 INJECTION, SOLUTION INTRAMUSCULAR; INTRAVENOUS; SUBCUTANEOUS at 00:31

## 2020-11-22 RX ADMIN — OXYCODONE HYDROCHLORIDE 10 MG: 5 TABLET ORAL at 13:26

## 2020-11-22 RX ADMIN — OXYCODONE HYDROCHLORIDE 10 MG: 5 TABLET ORAL at 03:34

## 2020-11-22 RX ADMIN — HYDROMORPHONE HYDROCHLORIDE 1 MG: 1 INJECTION, SOLUTION INTRAMUSCULAR; INTRAVENOUS; SUBCUTANEOUS at 05:20

## 2020-11-22 ASSESSMENT — PAIN DESCRIPTION - PAIN TYPE
TYPE: SURGICAL PAIN

## 2020-11-22 ASSESSMENT — PAIN SCALES - GENERAL
PAINLEVEL_OUTOF10: 6
PAINLEVEL_OUTOF10: 6
PAINLEVEL_OUTOF10: 8
PAINLEVEL_OUTOF10: 7
PAINLEVEL_OUTOF10: 7
PAINLEVEL_OUTOF10: 9
PAINLEVEL_OUTOF10: 0

## 2020-11-22 ASSESSMENT — PAIN DESCRIPTION - LOCATION
LOCATION: ABDOMEN

## 2020-11-22 ASSESSMENT — PAIN DESCRIPTION - DESCRIPTORS
DESCRIPTORS: PRESSURE
DESCRIPTORS: PRESSURE

## 2020-11-22 NOTE — PLAN OF CARE
Problem: Pain:  Goal: Pain level will decrease  Description: Pain level will decrease  11/22/2020 0953 by Samia Lowery RN  Outcome: Ongoing     Problem: Pain:  Goal: Control of acute pain  Description: Control of acute pain  11/22/2020 0953 by Samia Lowery RN  Outcome: Ongoing     Problem: Pain:  Goal: Control of chronic pain  Description: Control of chronic pain  11/22/2020 0953 by Samia Lowery RN  Outcome: Ongoing

## 2020-11-22 NOTE — DISCHARGE SUMMARY
Surgery Discharge Summary    Patient Identification  Maki Kraft is a 28 y.o. male. :  1985  Admit Date:  2020    Discharge date:   No discharge date for patient encounter. 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    Sprain of acromioclavicular ligament of right shoulder, sequela    Pain in right shoulder    Body mass index (BMI) less than or equal to 19 in adult    Foreign body (FB) in soft tissue    Low back pain    Neck sprain and strain    Nephrolithiasis    Nevus    Pain in both wrists    Pain in thoracic spine    Passage of bloody stools    Seborrheic dermatitis    Symptomatic cholelithiasis    Pain, postoperative, acute         Consults: none    Surgery: Laparoscopy    Patient Instructions: Activity: no heavy lifting for 3 weeks  Diet: regular diet  Wound Care: as directed    Follow-up with Dr. Quan Reddy this week. See pre-printed instructions in chart and given to patient upon discharge. Discharge Medications:      [employer]   Arlee Medication Instructions [id number]    Printed on:20 3669   Medication Information                      oxyCODONE (ROXICODONE) 5 MG immediate release tablet  Take 1 tablet by mouth every 6 hours as needed for Pain for up to 7 days. Intended supply: 7 days. Take lowest dose possible to manage pain             tiZANidine (ZANAFLEX) 4 MG tablet  Take 4 mg by mouth every 6 hours as needed             VENTOLIN  (90 Base) MCG/ACT inhaler                    Condition at discharge: Stable    HPI and Hospital Course: Emergent surgery for hemoperitoneum and uneventful recovery.         [facility]

## 2020-11-22 NOTE — PLAN OF CARE
Problem: Pain:  Goal: Pain level will decrease  Description: Pain level will decrease  11/22/2020 1347 by John Milton RN  Outcome: Completed     Problem: Pain:  Goal: Control of acute pain  Description: Control of acute pain  11/22/2020 1347 by John Milton RN  Outcome: Completed     Problem: Pain:  Goal: Control of chronic pain  Description: Control of chronic pain  11/22/2020 1347 by John Milton RN  Outcome: Completed

## 2020-11-23 ENCOUNTER — TELEPHONE (OUTPATIENT)
Dept: SURGERY | Age: 35
End: 2020-11-23

## 2020-11-23 NOTE — TELEPHONE ENCOUNTER
Pt had robotic andra  on 11/19 he also has a drain, with Thanksgiving being on Thursday  when should we have pt come in to have his drain removed?

## 2020-11-24 RX ORDER — OXYCODONE HYDROCHLORIDE 5 MG/1
5 TABLET ORAL EVERY 6 HOURS PRN
Qty: 24 TABLET | Refills: 0 | Status: SHIPPED | OUTPATIENT
Start: 2020-11-27 | End: 2020-12-04

## 2020-11-24 NOTE — TELEPHONE ENCOUNTER
I called and spoke to pt- scheduled his PO for next Tuesday. We discussed what to take for gas/bloating otc. He is concerned that with the holiday weekend that he will be out of pain medication- he's not requesting an early refill, can you put an Rx on hold for him to  over the weekend? Specify fill date,? He is taking 1-2 every 4-6 hrs, with ibuprofen. Please advise.

## 2020-11-24 NOTE — TELEPHONE ENCOUNTER
Pt called back stating he received a missed call from our office. He figured out that it was from his PCP and will follow up with them. He also was questioning if there was any type of medication he can take for his stomach bloating and gas pains. Says he is taking his pain medication, and tylenol as directed. Please advise.

## 2020-12-01 ENCOUNTER — OFFICE VISIT (OUTPATIENT)
Dept: SURGERY | Age: 35
End: 2020-12-01

## 2020-12-01 VITALS
HEIGHT: 67 IN | BODY MASS INDEX: 18.68 KG/M2 | WEIGHT: 119 LBS | TEMPERATURE: 97.8 F | DIASTOLIC BLOOD PRESSURE: 76 MMHG | SYSTOLIC BLOOD PRESSURE: 135 MMHG

## 2020-12-01 PROCEDURE — 99024 POSTOP FOLLOW-UP VISIT: CPT | Performed by: SURGERY

## 2020-12-01 NOTE — PROGRESS NOTES
[facility] GENERAL SURGERY      S:   Patient presents s/p robotic cholecystectomy and subsequent laparoscopic exploration for postoperative bleeding. He reports doing well with minimal drainage from the drain. O:   Comfortable         Incision sites healing well. Drain with minimal serous drainage. It was removed              A:   S/P robotic cholecystectomy and subsequent laparoscopic exploration    P:   Follow up as needed.

## 2020-12-17 ENCOUNTER — APPOINTMENT (OUTPATIENT)
Dept: GENERAL RADIOLOGY | Age: 35
End: 2020-12-17
Payer: MEDICARE

## 2020-12-17 ENCOUNTER — HOSPITAL ENCOUNTER (EMERGENCY)
Age: 35
Discharge: HOME | End: 2020-12-17
Attending: EMERGENCY MEDICINE
Payer: MEDICARE

## 2020-12-17 VITALS
OXYGEN SATURATION: 100 % | SYSTOLIC BLOOD PRESSURE: 139 MMHG | DIASTOLIC BLOOD PRESSURE: 93 MMHG | BODY MASS INDEX: 18.83 KG/M2 | HEART RATE: 71 BPM | HEIGHT: 67 IN | RESPIRATION RATE: 14 BRPM | WEIGHT: 120 LBS | TEMPERATURE: 98.5 F

## 2020-12-17 LAB
A/G RATIO: 1.6 (ref 1.1–2.2)
ALBUMIN SERPL-MCNC: 4 G/DL (ref 3.4–5)
ALP BLD-CCNC: 79 U/L (ref 40–129)
ALT SERPL-CCNC: 30 U/L (ref 10–40)
ANION GAP SERPL CALCULATED.3IONS-SCNC: 8 MMOL/L (ref 3–16)
AST SERPL-CCNC: 17 U/L (ref 15–37)
BASOPHILS ABSOLUTE: 0 K/UL (ref 0–0.2)
BASOPHILS RELATIVE PERCENT: 0.4 %
BILIRUB SERPL-MCNC: <0.2 MG/DL (ref 0–1)
BUN BLDV-MCNC: 12 MG/DL (ref 7–20)
CALCIUM SERPL-MCNC: 8.7 MG/DL (ref 8.3–10.6)
CHLORIDE BLD-SCNC: 104 MMOL/L (ref 99–110)
CO2: 24 MMOL/L (ref 21–32)
CREAT SERPL-MCNC: 0.6 MG/DL (ref 0.9–1.3)
EKG ATRIAL RATE: 69 BPM
EKG DIAGNOSIS: NORMAL
EKG P AXIS: 77 DEGREES
EKG P-R INTERVAL: 130 MS
EKG Q-T INTERVAL: 376 MS
EKG QRS DURATION: 88 MS
EKG QTC CALCULATION (BAZETT): 402 MS
EKG R AXIS: 85 DEGREES
EKG T AXIS: 70 DEGREES
EKG VENTRICULAR RATE: 69 BPM
EOSINOPHILS ABSOLUTE: 0.1 K/UL (ref 0–0.6)
EOSINOPHILS RELATIVE PERCENT: 0.9 %
GFR AFRICAN AMERICAN: >60
GFR NON-AFRICAN AMERICAN: >60
GLOBULIN: 2.5 G/DL
GLUCOSE BLD-MCNC: 103 MG/DL (ref 70–99)
HCT VFR BLD CALC: 31.9 % (ref 40.5–52.5)
HEMOGLOBIN: 10.6 G/DL (ref 13.5–17.5)
LIPASE: 62 U/L (ref 13–60)
LYMPHOCYTES ABSOLUTE: 1.2 K/UL (ref 1–5.1)
LYMPHOCYTES RELATIVE PERCENT: 14.9 %
MCH RBC QN AUTO: 30.7 PG (ref 26–34)
MCHC RBC AUTO-ENTMCNC: 33.1 G/DL (ref 31–36)
MCV RBC AUTO: 92.8 FL (ref 80–100)
MONOCYTES ABSOLUTE: 0.6 K/UL (ref 0–1.3)
MONOCYTES RELATIVE PERCENT: 7.2 %
NEUTROPHILS ABSOLUTE: 6.1 K/UL (ref 1.7–7.7)
NEUTROPHILS RELATIVE PERCENT: 76.6 %
PDW BLD-RTO: 14.6 % (ref 12.4–15.4)
PLATELET # BLD: 296 K/UL (ref 135–450)
PMV BLD AUTO: 6.4 FL (ref 5–10.5)
POTASSIUM SERPL-SCNC: 3.7 MMOL/L (ref 3.5–5.1)
RBC # BLD: 3.44 M/UL (ref 4.2–5.9)
SODIUM BLD-SCNC: 136 MMOL/L (ref 136–145)
TOTAL PROTEIN: 6.5 G/DL (ref 6.4–8.2)
TROPONIN: <0.01 NG/ML
WBC # BLD: 8 K/UL (ref 4–11)

## 2020-12-17 PROCEDURE — 93010 ELECTROCARDIOGRAM REPORT: CPT | Performed by: INTERNAL MEDICINE

## 2020-12-17 PROCEDURE — 85025 COMPLETE CBC W/AUTO DIFF WBC: CPT

## 2020-12-17 PROCEDURE — 84484 ASSAY OF TROPONIN QUANT: CPT

## 2020-12-17 PROCEDURE — 93005 ELECTROCARDIOGRAM TRACING: CPT | Performed by: EMERGENCY MEDICINE

## 2020-12-17 PROCEDURE — 80053 COMPREHEN METABOLIC PANEL: CPT

## 2020-12-17 PROCEDURE — 99284 EMERGENCY DEPT VISIT MOD MDM: CPT

## 2020-12-17 PROCEDURE — 83690 ASSAY OF LIPASE: CPT

## 2020-12-17 PROCEDURE — 71045 X-RAY EXAM CHEST 1 VIEW: CPT

## 2020-12-17 ASSESSMENT — PAIN SCALES - GENERAL: PAINLEVEL_OUTOF10: 7

## 2020-12-17 NOTE — ED NOTES
This RN walked into pt's room to draw lactic acid that was ordered. As soon as I opened the door pt informed me that he was leaving. I asked him what was wrong, he said nothing nothing, he knows that he can sign out AMA and that he wanted to go now. I informed him why I came into his room and if he really wants to leave, to please let me get his provider to speak with him before he makes his final decision and that he cannot leave with the iv in his arm. He said he would wait. I spoke with provider and she stated that she had talked to him and that he was ok with plan but if he wants to leave that is ok, to just have him sign AMA papers. So, I brought AMA papers into room and provider reminded pt that if he started to feel poorly again that he should return. Pt verbalized understanding.      Gerda Dancer, RN  12/17/20 6393

## 2020-12-17 NOTE — ED PROVIDER NOTES
[place] [address] ED      CHIEF COMPLAINT  Chest Pain (pt complains of chest pain upon inhalation under right pectoral, started  four days ago)       HISTORY OF PRESENT ILLNESS  Maki Kraft is a 28 y.o. male with history of cholecystectomy who presents to the emergency department for evaluation of cough, right-sided chest pain that is worse with cough, and pain in the right shoulder. Patient reports having new right-sided chest pain and right shoulder pain that started 2 to 3 days ago. Patient says chest pain is worse with deep breathing and with cough. Denies any known sick contacts. Denies having any abdominal pain since discharge from hospital after he was admitted for postop bleed after cholecystectomy. No other complaints, modifying factors or associated symptoms. I have reviewed the following from the nursing documentation.     Past Medical History:   Diagnosis Date    Kidney stone     Polyp of colon      Past Surgical History:   Procedure Laterality Date    CHOLECYSTECTOMY, LAPAROSCOPIC N/A 11/19/2020    ROBOTIC CHOLECYSTECTOMY performed by Melvin Almanza MD at [address] [place] [address]  06/05/2017    polyps    HAND SURGERY  2009    right    LAPAROSCOPY N/A 11/20/2020    LAPAROSCOPY N/A 11/20/2020    EXPLORATORY LAPAROSCOPY performed by Melvin Almanza MD at [place] [address] (N/A Abdomen)     NM SHOULDER SCOPE BONE SHAVING Right 10/15/2018    RIGHT SHOULDER VIDEO ARTHROSCOPY, DISTAL CLAVICLE EXCISION performed by Carolina Kidd DO at [facility] Right 10/15/2018    TOE SURGERY Left 07/03/2017    matrixectomy fibular schaeffer    WISDOM TOOTH EXTRACTION       Family History   Problem Relation Age of Onset    Arthritis Other     Asthma Other     Cancer Other     Diabetes Other     High Blood Pressure Other     Stroke Other      Social History     Socioeconomic History    Marital status: Single Spouse name: Not on file    Number of children: Not on file    Years of education: Not on file    Highest education level: Not on file   Occupational History    Not on file   Social Needs    Financial resource strain: Not on file    Food insecurity     Worry: Not on file     Inability: Not on file    Transportation needs     Medical: Not on file     Non-medical: Not on file   Tobacco Use    Smoking status: Current Every Day Smoker     Packs/day: 0.50     Types: Cigarettes     Last attempt to quit: 2/18/2017     Years since quitting: 3.8    Smokeless tobacco: Never Used   Substance and Sexual Activity    Alcohol use: No    Drug use: Yes     Types: Marijuana     Comment: occasional    Sexual activity: Yes     Partners: Female   Lifestyle    Physical activity     Days per week: Not on file     Minutes per session: Not on file    Stress: Not on file   Relationships    Social connections     Talks on phone: Not on file     Gets together: Not on file     Attends Anglican service: Not on file     Active member of club or organization: Not on file     Attends meetings of clubs or organizations: Not on file     Relationship status: Not on file    Intimate partner violence     Fear of current or ex partner: Not on file     Emotionally abused: Not on file     Physically abused: Not on file     Forced sexual activity: Not on file   Other Topics Concern    Not on file   Social History Narrative    Not on file     No current facility-administered medications for this encounter. Current Outpatient Medications   Medication Sig Dispense Refill    tiZANidine (ZANAFLEX) 4 MG tablet Take 4 mg by mouth every 6 hours as needed      VENTOLIN  (90 Base) MCG/ACT inhaler        No Known Allergies    REVIEW OF SYSTEMS  10 systems reviewed, pertinent positives per HPI otherwise noted to be negative.     PHYSICAL EXAM  BP (!) 139/93   Pulse 71   Temp 98.5 °F (36.9 °C)   Resp 14   Ht 5' 7\" (1.702 m)   Wt 120 lb (54.4 kg)   SpO2 100%   BMI 18.79 kg/m²    GENERAL APPEARANCE: Awake and alert. Cooperative. No acute distress. HENT: Normocephalic. Atraumatic. CN  2-12 grossly intact. HEART/CHEST: Tenderness to palpation over bilateral chest wall. RRR. No murmurs appreciated  LUNGS: Respirations unlabored. Speaking comfortably in full sentences. CTAB. ABDOMEN: Soft, non-distended abdomen. Non tender to palpation. No guarding. No rebound. EXTREMITIES: No gross deformities. Moving all extremities. All extremities neurovascularly intact. SKIN: Warm and dry. No acute rashes. NEUROLOGICAL: Alert and oriented. CN's 2-12 intact. No gross facial drooping. Strength 5/5, sensation intact. PSYCHIATRIC: Normal mood and affect.     LABS  Results for orders placed or performed during the hospital encounter of 12/17/20   CBC Auto Differential   Result Value Ref Range    WBC 8.0 4.0 - 11.0 K/uL    RBC 3.44 (L) 4.20 - 5.90 M/uL    Hemoglobin 10.6 (L) 13.5 - 17.5 g/dL    Hematocrit 31.9 (L) 40.5 - 52.5 %    MCV 92.8 80.0 - 100.0 fL    MCH 30.7 26.0 - 34.0 pg    MCHC 33.1 31.0 - 36.0 g/dL    RDW 14.6 12.4 - 15.4 %    Platelets 565 098 - 474 K/uL    MPV 6.4 5.0 - 10.5 fL    Neutrophils % 76.6 %    Lymphocytes % 14.9 %    Monocytes % 7.2 %    Eosinophils % 0.9 %    Basophils % 0.4 %    Neutrophils Absolute 6.1 1.7 - 7.7 K/uL    Lymphocytes Absolute 1.2 1.0 - 5.1 K/uL    Monocytes Absolute 0.6 0.0 - 1.3 K/uL    Eosinophils Absolute 0.1 0.0 - 0.6 K/uL    Basophils Absolute 0.0 0.0 - 0.2 K/uL   Comprehensive Metabolic Panel   Result Value Ref Range    Sodium 136 136 - 145 mmol/L    Potassium 3.7 3.5 - 5.1 mmol/L    Chloride 104 99 - 110 mmol/L    CO2 24 21 - 32 mmol/L    Anion Gap 8 3 - 16    Glucose 103 (H) 70 - 99 mg/dL    BUN 12 7 - 20 mg/dL    CREATININE 0.6 (L) 0.9 - 1.3 mg/dL    GFR Non-African American >60 >60    GFR African American >60 >60    Calcium 8.7 8.3 - 10.6 mg/dL    Total Protein 6.5 6.4 - 8.2 g/dL    Alb 4.0 3.4 - 5.0 g/dL ORDERING SYSTEM PROVIDED HISTORY: ruq pain TECHNOLOGIST PROVIDED HISTORY: Reason for exam:->ruq pain Reason for Exam: ruq pain Acuity: Unknown Type of Exam: Unknown Right upper quadrant abdominal pain FINDINGS: LIVER:  The liver demonstrates normal echogenicity without evidence of intrahepatic biliary ductal dilatation. BILIARY SYSTEM:  Shadowing echogenic foci and layering sludge. Gallbladder wall thickening measuring approximately 4 mm. Negative sonographic Campos sign. Common bile duct is within normal limits measuring 4 mm. RIGHT KIDNEY: The right kidney is grossly unremarkable without evidence of hydronephrosis. 10.6 x 4.4 x 5.3 cm. Small simple cysts, largest measuring up to 2.4 cm. PANCREAS:  Visualized portions of the pancreas are unremarkable. OTHER: No evidence of right upper quadrant ascites. Cholelithiasis with mild gallbladder wall thickening. No pericholecystic fluid. Negative sonographic Campos sign. Xr Chest Portable    Result Date: 12/17/2020  EXAMINATION: ONE XRAY VIEW OF THE CHEST 12/17/2020 1:00 pm COMPARISON: 11/20/2020 HISTORY: ORDERING SYSTEM PROVIDED HISTORY: other TECHNOLOGIST PROVIDED HISTORY: Reason for exam:->other Reason for Exam: chest pain Acuity: Acute Type of Exam: Initial FINDINGS: The lungs are clear. The cardiac silhouette is within normal limits. There is no pneumothorax or pleural effusion. 1.  No acute abnormality. Xr Chest Portable    Result Date: 11/20/2020  EXAMINATION: ONE XRAY VIEW OF THE CHEST 11/20/2020 7:40 am COMPARISON: 11/19/2020 HISTORY: ORDERING SYSTEM PROVIDED HISTORY: cough TECHNOLOGIST PROVIDED HISTORY: Reason for exam:->cough Reason for Exam: post op abd pain Acuity: Acute Type of Exam: Initial FINDINGS: Single view of the chest was obtained. No confluent focal pulmonary opacities to suggest acute airspace disease. No evidence of pleural effusion or pneumothorax. Cardiac and mediastinal silhouettes are within normal limits.      No acute cardiopulmonary disease. Xr Chest Portable    Result Date: 11/19/2020  EXAMINATION: ONE XRAY VIEW OF THE CHEST 11/19/2020 2:54 am COMPARISON: August 7, 2019 HISTORY: ORDERING SYSTEM PROVIDED HISTORY: ruq pain TECHNOLOGIST PROVIDED HISTORY: Reason for exam:->ruq pain Reason for Exam: right upper chest pain Acuity: Acute Type of Exam: Initial Right chest pain FINDINGS: Cardiomediastinal silhouette within normal limits. Lungs and costophrenic sulci are clear. No pneumothorax or subdiaphragmatic free air. No acute osseous abnormality identified. No radiographic evidence of acute cardiopulmonary disease. Cta Abdomen Pelvis W Contrast    Result Date: 11/20/2020  EXAMINATION: CTA OF THE ABDOMEN AND PELVIS WITH CONTRAST 11/20/2020 10:38 am: TECHNIQUE: CTA of the abdomen and pelvis was performed with the administration of intravenous contrast. Multiplanar reformatted images are provided for review. MIP images are provided for review. Dose modulation, iterative reconstruction, and/or weight based adjustment of the mA/kV was utilized to reduce the radiation dose to as low as reasonably achievable. COMPARISON: CT abdomen and pelvis earlier today. HISTORY: ORDERING SYSTEM PROVIDED HISTORY: intraabdominal bleed, Hgb down 5 grams TECHNOLOGIST PROVIDED HISTORY: Reason for exam:->intraabdominal bleed, Hgb down 5 grams Reason for Exam: gi bleed? Acuity: Acute Type of Exam: Subsequent/Follow-up FINDINGS: CTA ABDOMEN: On coronal image 51 of series 601 and axial image 92 of series 2 there is in ill-defined focus of contrast measuring 6 mm within the gallbladder fossa at the surgery site. High-density fluid has increased adjacent to the liver since the examination 2 hours ago. There also is increased fluid in the right lower quadrant and right pericolic gutter. CTA PELVIS: Increased hemorrhagic fluid in the right-sided pelvis.   Partially clotted fluid is present in the posterior pelvis, obscured by dense contrast in the bladder at this time, better demonstrated on the CT 2 hours ago. Active hemorrhagic extravasation from a small artery in the gallbladder fossa, favored to be a branch of the cystic artery. No hemorrhage elsewhere including the trocar sites within the anterior abdominal wall. Critical results were discussed by Dr. Alicia Car. MD Al to [address] on 11/20/2020 at 11:26. ED COURSE/MDM  Patient seen and evaluated. At presentation, patient was awake, alert, afebrile, hemodynamically stable, and satting well on room air. Exam remarkable for tenderness over the bilateral chest wall. Abdomen soft, nondistended, and nontender to palpation throughout. Given the tenderness to palpation over the chest wall and worse pain with cough, I suspect pain is musculoskeletal in etiology. However, as differential diagnosis includes pneumonia, ACS, arrhythmia, among others, CBC, CMP, EKG, lipase, and chest x-ray obtained. Work-up remarkable for no leukocytosis. Hemoglobin was 10.6, improved from prior hemoglobin of 7.8 one-month ago. Creatinine was 0.6. Troponin was less than 0.01. Lipase borderline elevated at 62. Given his recent cholecystectomy and borderline elevated lipase, discussed obtaining CT abdomen pelvis. Patient said he has not been having any abdominal pain since his discharge from the hospital and did not think it was abdominal pain. Patient said he thinks the pain is related to cough. Patient initially agreeable with obtaining Covid swab. Pending lactate and Covid swab, patient said he was ready to leave. Patient demonstrated the capacity to make decisions. He declined the Covid swab. Patient encouraged to return to the emergency department if persistent or worsening symptoms. Patient signed out AMA. Pt was seen during the [place] 19 pandemic. Appropriate PPE worn by ME during patient encounters.  Pt seen during a time with constrained hospital bed capacity and other potential inpatient and outpatient resources were constrained due to the viral pandemic. During the patient's ED course, the patient was given:  Medications - No data to display     CLINICAL IMPRESSION  1. Chest wall pain    2. Cough        Blood pressure (!) 139/93, pulse 71, temperature 98.5 °F (36.9 °C), resp. rate 14, height 5' 7\" (1.702 m), weight 120 lb (54.4 kg), SpO2 100 %. DISPOSITION  Jonatan Siegel left AMA. Patient was given scripts for the following medications. I counseled patient how to take these medications. Discharge Medication List as of 12/17/2020  2:11 PM          Follow-up with:  No follow-up provider specified. DISCLAIMER: This chart was created using Dragon dictation software. Efforts were made by me to ensure accuracy, however some errors may be present due to limitations of this technology and occasionally words are not transcribed correctly.         Miguel Angel Toussaint MD  12/18/20 5568       Miguel Angel Toussaint MD  12/18/20 9781

## 2020-12-18 ENCOUNTER — CARE COORDINATION (OUTPATIENT)
Dept: CARE COORDINATION | Age: 35
End: 2020-12-18

## 2020-12-18 NOTE — CARE COORDINATION
Call to patient as follow up from ER visit r/t risk for covid based on presenting sx and/ or medical hx. Per chart review pt left AMA provided strict return precautions  Not tested for covid  Recent choly.     LM on VM with this [facility] name and number to please call with questions/ concerns    Will continue outreach

## 2022-04-15 ENCOUNTER — APPOINTMENT (OUTPATIENT)
Dept: GENERAL RADIOLOGY | Age: 37
End: 2022-04-15
Payer: MEDICARE

## 2022-04-15 ENCOUNTER — HOSPITAL ENCOUNTER (EMERGENCY)
Age: 37
Discharge: HOME | End: 2022-04-16
Attending: STUDENT IN AN ORGANIZED HEALTH CARE EDUCATION/TRAINING PROGRAM
Payer: MEDICARE

## 2022-04-15 VITALS
HEIGHT: 67 IN | WEIGHT: 125 LBS | HEART RATE: 87 BPM | TEMPERATURE: 98 F | DIASTOLIC BLOOD PRESSURE: 58 MMHG | RESPIRATION RATE: 17 BRPM | SYSTOLIC BLOOD PRESSURE: 95 MMHG | OXYGEN SATURATION: 96 % | BODY MASS INDEX: 19.62 KG/M2

## 2022-04-15 DIAGNOSIS — S62.306A CLOSED NONDISPLACED FRACTURE OF FIFTH METACARPAL BONE OF RIGHT HAND, UNSPECIFIED PORTION OF METACARPAL, INITIAL ENCOUNTER: Primary | ICD-10-CM

## 2022-04-15 PROCEDURE — 99283 EMERGENCY DEPT VISIT LOW MDM: CPT

## 2022-04-15 PROCEDURE — 73120 X-RAY EXAM OF HAND: CPT

## 2022-04-15 PROCEDURE — 73130 X-RAY EXAM OF HAND: CPT

## 2022-04-15 ASSESSMENT — PAIN DESCRIPTION - LOCATION: LOCATION: HAND

## 2022-04-15 ASSESSMENT — PAIN DESCRIPTION - PAIN TYPE: TYPE: ACUTE PAIN

## 2022-04-15 ASSESSMENT — PAIN - FUNCTIONAL ASSESSMENT: PAIN_FUNCTIONAL_ASSESSMENT: 0-10

## 2022-04-15 ASSESSMENT — PAIN DESCRIPTION - ORIENTATION: ORIENTATION: RIGHT

## 2022-04-15 ASSESSMENT — PAIN SCALES - GENERAL: PAINLEVEL_OUTOF10: 6

## 2022-04-16 ASSESSMENT — ENCOUNTER SYMPTOMS: GASTROINTESTINAL NEGATIVE: 1

## 2022-04-16 NOTE — ED PROVIDER NOTES
[address] ED  EMERGENCY DEPARTMENT ENCOUNTER      Pt Name: Jasmeet Hernandez  MRN: [id number]  [place] 1985  Date of evaluation: 4/15/2022  Provider: Kamille Glez, [address] [place] [address]       Chief Complaint   Patient presents with    Hand Injury     Patient states he punched a wall and injured his right hand. has had previous surgery on the same hand for the same reason         HISTORY OF PRESENT ILLNESS   (Location/Symptom, Timing/Onset, Context/Setting, Quality, Duration, Modifying Factors, Severity)  Note limiting factors. Jasmeet Hernandez is a 39 y.o. male who presents to the emergency department complaining of right hand pain with swelling over the dorsal fourth and fifth metacarpal.  Prior history of boxer's fracture. Reports that he was frustrated punched a wall earlier this evening. He is able to fully open and close digits. He reports intact sensation to the digits. No other injury denies pain at the wrist or elbow. No open wound. Nursing Notes were reviewed.     PAST MEDICAL HISTORY     Past Medical History:   Diagnosis Date    Kidney stone     Polyp of colon          SURGICAL HISTORY       Past Surgical History:   Procedure Laterality Date    CHOLECYSTECTOMY, LAPAROSCOPIC N/A 11/19/2020    ROBOTIC CHOLECYSTECTOMY performed by Luis Arzate MD at [address] [place] [address]  06/05/2017    polyps    HAND SURGERY  2009    right    LAPAROSCOPY N/A 11/20/2020    LAPAROSCOPY N/A 11/20/2020    EXPLORATORY LAPAROSCOPY performed by Luis Arzate MD at [place] [address] (N/A Abdomen)     MA SHOULDER SCOPE BONE SHAVING Right 10/15/2018    RIGHT SHOULDER VIDEO ARTHROSCOPY, DISTAL CLAVICLE EXCISION performed by Alden Ackerman DO at [facility] Right 10/15/2018    TOE SURGERY Left 07/03/2017    matrixectomy fibular schaeffer    WISDOM TOOTH EXTRACTION           CURRENT MEDICATIONS       Previous Medications    TIZANIDINE (ZANAFLEX) 4 MG TABLET    Take 4 mg by mouth every 6 hours as needed    VENTOLIN  (90 BASE) MCG/ACT INHALER           ALLERGIES     Patient has no known allergies. FAMILY HISTORY       Family History   Problem Relation Age of Onset    Arthritis Other     Asthma Other     Cancer Other     Diabetes Other     High Blood Pressure Other     Stroke Other           SOCIAL HISTORY       Social History     Socioeconomic History    Marital status: Single     Spouse name: None    Number of children: None    Years of education: None    Highest education level: None   Occupational History    None   Tobacco Use    Smoking status: Current Every Day Smoker     Packs/day: 0.50     Types: Cigarettes     Last attempt to quit: 2017     Years since quittin.1    Smokeless tobacco: Never Used   Substance and Sexual Activity    Alcohol use: No    Drug use: Yes     Types: Marijuana Magda Aver)     Comment: occasional    Sexual activity: Yes     Partners: Female   Other Topics Concern    None   Social History Narrative    None     Social Determinants of Health     Financial Resource Strain:     Difficulty of Paying Living Expenses: Not on file   Food Insecurity:     Worried About Running Out of Food in the Last Year: Not on file    Destinee of Food in the Last Year: Not on file   Transportation Needs:     Lack of Transportation (Medical): Not on file    Lack of Transportation (Non-Medical):  Not on file   Physical Activity:     Days of Exercise per Week: Not on file    Minutes of Exercise per Session: Not on file   Stress:     Feeling of Stress : Not on file   Social Connections:     Frequency of Communication with Friends and Family: Not on file    Frequency of Social Gatherings with Friends and Family: Not on file    Attends Sabianism Services: Not on file    Active Member of Clubs or Organizations: Not on file    Attends Club or Organization Meetings: Not on file   Porsche Shane Marital Status: Not on file   Intimate Partner Violence:     Fear of Current or Ex-Partner: Not on file    Emotionally Abused: Not on file    Physically Abused: Not on file    Sexually Abused: Not on file   Housing Stability:     Unable to Pay for Housing in the Last Year: Not on file    Number of [place] in the Last Year: Not on file    Unstable Housing in the Last Year: Not on file       SCREENINGS        [place] Coma Scale  Eye Opening: Spontaneous  Best Verbal Response: Oriented  Best Motor Response: Obeys commands  [place] Coma Scale Score: 15                   REVIEW OF SYSTEMS    (2-9 systems for level 4, 10 or more for level 5)   Review of Systems   Constitutional: Negative. HENT: Negative. Gastrointestinal: Negative. Musculoskeletal: Positive for arthralgias and joint swelling. Skin: Negative for wound. Neurological: Negative for weakness and numbness. PHYSICAL EXAM    (up to 7 for level 4, 8 or more for level 5)   RECENT VITALS:     Temp: 98 °F (36.7 °C),  Pulse: 87, Resp: 17, BP: (!) 95/58, SpO2: 96 %    Physical Exam  Constitutional:       Appearance: Normal appearance. Cardiovascular:      Rate and Rhythm: Normal rate and regular rhythm. Pulmonary:      Effort: No respiratory distress. Musculoskeletal:      Comments: Right hand: There is tenderness and swelling with ecchymosis over the dorsal right hand. Intact flexion extension the digits. He is neurovascular intact distally. Pain at wrist, elbow. Neurological:      Mental Status: He is alert. DIAGNOSTIC RESULTS     EKG: All EKG's are interpreted by the Emergency Department Physician who either signs or Co-signs this chart in the absence of a cardiologist.        RADIOLOGY:   Non-plain film images such as CT, Ultrasound and MRI are read by the radiologist. Plain radiographic images are visualized and preliminarily interpreted by the emergency physician.     Interpretation per the Radiologist below, if available at the time of this note:    XR HAND RIGHT (MIN 3 VIEWS)   Final Result   Nondisplaced nonangulated fracture through the distal diaphysis of the 5th   metacarpal extending to the metacarpal neck. LABS:  Labs Reviewed - No data to display    All other labs were within normal range or not returned as of this dictation. EMERGENCY DEPARTMENT COURSE and DIFFERENTIAL DIAGNOSIS/MDM:   Josué Jimenez is a 39 y.o. male who presents to the emergency department with the complaint of right hand pain. Boxer's fracture noted on plain film in June nondisplaced. Not angulated. Ulnar gutter splint applied. Normal neurologic and vascular status post splint application. Outpatient orthopedic surgery follow-up. CRITICAL CARE TIME   Total Critical Care time was 0 minutes, excluding separately reportable procedures. There was a high probability of clinically significant/life threatening deterioration in the patient's condition which required my urgent intervention. Clinical concern   Intervention     CONSULTS:  None    PROCEDURES:  Unless otherwise noted below, none     Splint Application    Date/Time: 4/16/2022 12:05 AM  Performed by: Terrell Abreu DO  Authorized by: Terrell Abreu DO     Consent:     Consent obtained:  Verbal    Consent given by:  Patient    Risks discussed:  Discoloration, numbness and pain    Alternatives discussed:  No treatment  Pre-procedure details:     Sensation:  Normal    Skin color:  Pink  Procedure details:     Laterality:  Right    Location:  Hand    Hand:  R hand    Strapping: no      Splint type:  Ulnar gutter    Supplies:  Ortho-Glass  Post-procedure details:     Pain:  Unchanged    Sensation:  Normal    Skin color:  Pink    Patient tolerance of procedure: Tolerated well, no immediate complications            FINAL IMPRESSION      1.  Closed nondisplaced fracture of fifth metacarpal bone of right hand, unspecified portion of metacarpal, initial encounter DISPOSITION/PLAN   DISPOSITION  dc      PATIENT [facility]:  [facility]  [address] [place] [place] [address]  [phone]    If symptoms worsen    Rayray Garcia MD  [address] [facility]  [phone]    Schedule an appointment as soon as possible for a visit in 1 week  Hand fracture      DISCHARGE MEDICATIONS:  New Prescriptions    No medications on file     Controlled Substances Monitoring:     No flowsheet data found.     (Please note that portions of this note were completed with a voice recognition program.  Efforts were made to edit the dictations but occasionally words are mis-transcribed.)    Neel Adame DO (electronically signed)  Attending Emergency Physician            Neel Adame DO  04/16/22 7845

## 2022-04-19 ENCOUNTER — TELEPHONE (OUTPATIENT)
Dept: ORTHOPEDIC SURGERY | Age: 37
End: 2022-04-19

## 2022-04-19 NOTE — TELEPHONE ENCOUNTER
Appointment Request     Patient requesting earlier appointment: Yes  Appointment offered to patient: NO  Patient Contact Number: [phone]    PATIENT CALLING REGARDING GETTING A SOONER APPOINTMENT. PATIENT WAS SEEN IN [facility], ON Friday, April 15, 2022 WITH A BROKEN PINKY FINGER. PLEASE CALL BACK PATIENT AT THE ABOVE NUMBER.

## 2022-04-19 NOTE — TELEPHONE ENCOUNTER
Appointment Request     Patient requesting earlier appointment: Yes  Appointment offered to patient: NO  Patient Contact Number: [phone]

## 2022-04-20 NOTE — TELEPHONE ENCOUNTER
Patient is scheduled gave him the option of seeing another physician sooner but he took the 27th with Dr. Ramesh Luong

## 2022-04-27 ENCOUNTER — OFFICE VISIT (OUTPATIENT)
Dept: ORTHOPEDIC SURGERY | Age: 37
End: 2022-04-27
Payer: MEDICARE

## 2022-04-27 VITALS — WEIGHT: 128 LBS | BODY MASS INDEX: 20.09 KG/M2 | HEIGHT: 67 IN

## 2022-04-27 DIAGNOSIS — M79.641 PAIN OF RIGHT HAND: ICD-10-CM

## 2022-04-27 DIAGNOSIS — S62.366A CLOSED NONDISPLACED FRACTURE OF NECK OF FIFTH METACARPAL BONE OF RIGHT HAND, INITIAL ENCOUNTER: Primary | ICD-10-CM

## 2022-04-27 PROCEDURE — G8427 DOCREV CUR MEDS BY ELIG CLIN: HCPCS | Performed by: ORTHOPAEDIC SURGERY

## 2022-04-27 PROCEDURE — L3807 WHFO W/O JOINTS PRE CST: HCPCS | Performed by: ORTHOPAEDIC SURGERY

## 2022-04-27 PROCEDURE — 99203 OFFICE O/P NEW LOW 30 MIN: CPT | Performed by: ORTHOPAEDIC SURGERY

## 2022-04-27 PROCEDURE — G8420 CALC BMI NORM PARAMETERS: HCPCS | Performed by: ORTHOPAEDIC SURGERY

## 2022-04-27 PROCEDURE — 4004F PT TOBACCO SCREEN RCVD TLK: CPT | Performed by: ORTHOPAEDIC SURGERY

## 2022-04-27 RX ORDER — TRAMADOL HYDROCHLORIDE 50 MG/1
50 TABLET ORAL EVERY 6 HOURS PRN
Qty: 12 TABLET | Refills: 0 | Status: SHIPPED | OUTPATIENT
Start: 2022-04-27 | End: 2022-05-02

## 2022-04-29 NOTE — PROGRESS NOTES
ORTHOPAEDIC SURGERY INITIAL EVALUATION NOTE  Chief Complaint   Patient presents with    New Patient     R hand      HISTORY OF PRESENT ILLNESS:  40-year-old right-hand-dominant male presents for evaluation of right hand injury. He sustained an injury on 4/15/2022 when he punched a wall. He had immediate pain and swelling over his hand. He does have a prior history of surgical intervention for that hand. He sustained fracture dislocations of the fourth and fifth metacarpal bases. These were treated with pin fixation. He notes that he always has prominence of his hand. Since the time of his injury, he has had soreness in the hand but it is improving. He also notes improving swelling. Today he rates his pain a 8 out of 10 today. He has begun to initiate some gentle finger movement without significant issues. Past Medical History:   Diagnosis Date    Kidney stone     Polyp of colon        Current Outpatient Medications   Medication Sig Dispense Refill    traMADol (ULTRAM) 50 MG tablet Take 1 tablet by mouth every 6 hours as needed for Pain for up to 5 days. Intended supply: 5 days. Take lowest dose possible to manage pain 12 tablet 0    tiZANidine (ZANAFLEX) 4 MG tablet Take 4 mg by mouth every 6 hours as needed (Patient not taking: Reported on 4/15/2022)      VENTOLIN  (90 Base) MCG/ACT inhaler        No current facility-administered medications for this visit.         Past Surgical History:   Procedure Laterality Date    CHOLECYSTECTOMY, LAPAROSCOPIC N/A 11/19/2020    ROBOTIC CHOLECYSTECTOMY performed by Felix Blackmon MD at [address] [place] [address]  06/05/2017    polyps    HAND SURGERY  2009    right    LAPAROSCOPY N/A 11/20/2020    LAPAROSCOPY N/A 11/20/2020    EXPLORATORY LAPAROSCOPY performed by Felix Blackmon MD at [place] [address] (N/A Abdomen)     OK SHOULDER SCOPE BONE SHAVING Right 10/15/2018    RIGHT SHOULDER VIDEO ARTHROSCOPY, DISTAL CLAVICLE EXCISION performed by Kerwin Pelayo DO at [facility] Right 10/15/2018    TOE SURGERY Left 2017    matrixectomy fibular schaeffer    WISDOM TOOTH EXTRACTION         No Known Allergies    Family History   Problem Relation Age of Onset    Arthritis Other     Asthma Other     Cancer Other     Diabetes Other     High Blood Pressure Other     Stroke Other        Social History     Socioeconomic History    Marital status: Single     Spouse name: Not on file    Number of children: Not on file    Years of education: Not on file    Highest education level: Not on file   Occupational History    Not on file   Tobacco Use    Smoking status: Current Every Day Smoker     Packs/day: 0.50     Types: Cigarettes     Last attempt to quit: 2017     Years since quittin.1    Smokeless tobacco: Never Used   Substance and Sexual Activity    Alcohol use: No    Drug use: Yes     Types: Marijuana Curlie Opal)     Comment: occasional    Sexual activity: Yes     Partners: Female   Other Topics Concern    Not on file   Social History Narrative    Not on file     Social Determinants of Health     Financial Resource Strain:     Difficulty of Paying Living Expenses: Not on file   Food Insecurity:     Worried About Running Out of Food in the Last Year: Not on file    Destinee of Food in the Last Year: Not on file   Transportation Needs:     Lack of Transportation (Medical): Not on file    Lack of Transportation (Non-Medical):  Not on file   Physical Activity:     Days of Exercise per Week: Not on file    Minutes of Exercise per Session: Not on file   Stress:     Feeling of Stress : Not on file   Social Connections:     Frequency of Communication with Friends and Family: Not on file    Frequency of Social Gatherings with Friends and Family: Not on file    Attends Yazidism Services: Not on file    Active Member of Clubs or Organizations: Not on file   Melvina Faye Attends Club or Organization Meetings: Not on file    Marital Status: Not on file   Intimate Partner Violence:     Fear of Current or Ex-Partner: Not on file    Emotionally Abused: Not on file    Physically Abused: Not on file    Sexually Abused: Not on file   Housing Stability:     Unable to Pay for Housing in the Last Year: Not on file    Number of [place] in the Last Year: Not on file    Unstable Housing in the Last Year: Not on file     Review of Systems: Please see today's intake form for complete review of systems    PHYSICAL EXAM  Gen: awake, alert, oriented  HEENT: atraumatic, normocephalic  Neck: supple  Resp: equal chest rise bilaterally, no audible wheezes, no accessory muscle use. CV: Lower extremities warm and well perfused. Abd: soft, nontender   Focused examination of the right hand:  No cuts, open wounds, or abrasions to the right hand. There is obvious swelling overlying the fifth MCP joint. There is no bruising or ecchymosis. There is prominence of the dorsal hand. The patient reviews that there is always some deformity at the [facility] region. There is tenderness to palpation diffusely about the fifth metacarpal.  Gentle range of motion was assessed. The patient is able to make a 80% fist limited by pain. There is no significant scissoring or malalignment appreciated. Sensation is intact to light touch in median, radial, and ulnar distributions. Motor function is intact to FDS and FDP tendons. There is a intact extensor mechanism. There is brisk upper refill of the finger. RADIOGRAPHIC EXAM:  3 views of the hand obtained April 15, 2022 including PA, oblique, and lateral projections demonstrate nondisplaced fracture involving the fifth carpal neck. There is no displacement or angulation. The alignment is anatomic. There is no periosteal callus. There is prominence/deformity involving the dorsal hand at the fourth/fifth metacarpal base.   There is minor angular deformity at the fifth metacarpal metaphyseal region. Repeat x-rays obtained in clinic today including PA, oblique, and lateral projections demonstrate no change in alignment of fracture position. No interval displacement. There is near-anatomic alignment. No periosteal callus is seen. Fracture line is less conspicuous on today's x-rays, likely related to overlying casting material.    ASSESSEMENT AND PLAN    39 y.o. male with the following orthopaedic surgery problems:    1. Right fifth metacarpal neck fracture, nondisplaced, closed    Recommend nonoperative treatment. He was transitioned today from his splint to a removable ulnar gutter brace/knuckle orthosis. He will wear to protect the fracture in appropriate alignment while he heals. I counseled him on removing the brace for hygiene purposes and also to initiate gentle finger range of motion to prevent stiffness. These were reviewed and demonstrated to him today. He will limit the use of the right hand while his fracture healed. I did provide him with a one-time prescription for tramadol for pain control. He will use ice and elevation to limit the swelling. He will return to clinic in 4 weeks for a set of repeat x-rays to ensure appropriate interval healing occurs. It is likely he will wean from the brace at that time. He will see William Mcgraw PA-C in my absence.     Bernardo Altman MD

## (undated) DEVICE — REDUCER: Brand: ENDOWRIST

## (undated) DEVICE — SUTURE SZ 0 27IN 5/8 CIR UR-6  TAPER PT VIOLET ABSRB VICRYL J603H

## (undated) DEVICE — GOWN SIRUS NONREIN XL W/TWL: Brand: MEDLINE INDUSTRIES, INC.

## (undated) DEVICE — RESERVOIR,SUCTION,100CC,SILICONE: Brand: MEDLINE

## (undated) DEVICE — Device

## (undated) DEVICE — APPLICATOR PREP 26ML 0.7% IOD POVACRYLEX 74% ISO ALC ST

## (undated) DEVICE — TROCAR: Brand: KII SLEEVE

## (undated) DEVICE — MEDI-VAC NON-CONDUCTIVE SUCTION TUBING: Brand: CARDINAL HEALTH

## (undated) DEVICE — CANNULA SEAL

## (undated) DEVICE — 1010 S-DRAPE TOWEL DRAPE 10/BX: Brand: STERI-DRAPE™

## (undated) DEVICE — ARM DRAPE

## (undated) DEVICE — SUTURE VCRL SZ 4-0 L18IN ABSRB UD L19MM PS-2 3/8 CIR PRIM J496H

## (undated) DEVICE — SUTURE VCRL SZ 2-0 L27IN ABSRB UD L26MM SH 1/2 CIR J417H

## (undated) DEVICE — MAJOR SET UP PK

## (undated) DEVICE — BLADE SHV L13CM DIA5.5MM BNE CUT AGG DEB COOLCUT

## (undated) DEVICE — GAUZE SPONGES,8 PLY: Brand: CURITY

## (undated) DEVICE — SUTURE ETHBND EXCEL SZ 0 L18IN NONABSORBABLE GRN L22MM MO-7 CX41D

## (undated) DEVICE — GLOVE ORANGE PI 7 1/2   MSG9075

## (undated) DEVICE — 3M™ STERI-STRIP™ COMPOUND BENZOIN TINCTURE 40 BAGS/CARTON 4 CARTONS/CASE C1544: Brand: 3M™ STERI-STRIP™

## (undated) DEVICE — TROCAR: Brand: KII FIOS FIRST ENTRY

## (undated) DEVICE — BLADE SHV L13CM DIA4MM EXCALIBUR AGG COOLCUT

## (undated) DEVICE — SUTURE PERMA-HAND SZ 2-0 L30IN NONABSORBABLE BLK L26MM SH K833H

## (undated) DEVICE — SUTURE ETHLN SZ 3-0 L18IN NONABSORBABLE BLK PS-2 L19MM 3/8 1669H

## (undated) DEVICE — TIP COVER ACCESSORY

## (undated) DEVICE — TUBING PMP L16FT MAIN DISP FOR AR-6400 AR-6475

## (undated) DEVICE — DRAPE,ABDOMINAL,MAJOR,STERILE: Brand: MEDLINE

## (undated) DEVICE — AMIENT MEGAVAC 90 WAND: Brand: COBLATION

## (undated) DEVICE — DRAIN WND SIL FLAT RADIOPAQUE 10MM FULL FLUTED

## (undated) DEVICE — BLADELESS OBTURATOR: Brand: WECK VISTA

## (undated) DEVICE — 3M™ MEDIPORE™ SOFT CLOTH TAPE, 4 INCH X 10 YARDS, 12 ROLLS/CASE, 2964: Brand: 3M™ MEDIPORE™

## (undated) DEVICE — TUBING PMP L6FT CONT WAVE EXTN

## (undated) DEVICE — TROCAR ENDOSCP L100MM DIA11MM STBL SL BLDELSS DISP ENDOPATH

## (undated) DEVICE — ELECTRODE PT RET AD L9FT HI MOIST COND ADH HYDRGEL CORDED

## (undated) DEVICE — MAT TRACK 40 X 72 IN ABSORBENT

## (undated) DEVICE — STERILE LATEX POWDER-FREE SURGICAL GLOVESWITH NITRILE COATING: Brand: PROTEXIS

## (undated) DEVICE — NEEDLE SPNL 18GA L3IN S STL REG WALL FIT STYL PNK HUB W/

## (undated) DEVICE — SOLUTION IV IRRIG 500ML 0.9% SODIUM CHL 2F7123

## (undated) DEVICE — GLOVE,SURG,SENSICARE SLT,LF,PF,7.5: Brand: MEDLINE

## (undated) DEVICE — SHOULDER STABILIZATION KIT,                                    DISPOSABLE 12 PER BOX

## (undated) DEVICE — Z INACTIVE USE 2641839 CLIP INT M L POLYMER LOK LIG HEM O LOK

## (undated) DEVICE — PAD FELT FOR ARTHROVAC FLR SUCT SYS

## (undated) DEVICE — 3M™ DURAPORE™ SURGICAL TAPE 1538-3, 3 INCH X 10 YARD (7,5CM X 9,1M), 4 ROLLS/BOX: Brand: 3M™ DURAPORE™

## (undated) DEVICE — GLOVE SURG SZ 7 L12IN FNGR THK94MIL STD WHT ISOLEX LTX FREE

## (undated) DEVICE — SYRINGE MED 10ML LUERLOCK TIP W/O SFTY DISP

## (undated) DEVICE — SEAL

## (undated) DEVICE — NEEDLE HYPO 25GA L1.5IN BLU POLYPR HUB S STL REG BVL STR

## (undated) DEVICE — SMARTGOWN SURGICAL GOWN, XL: Brand: CONVERTORS

## (undated) DEVICE — DRAPE 70X60IN SPLIT IMPERV ADHES STRIP